# Patient Record
Sex: FEMALE | Race: WHITE | NOT HISPANIC OR LATINO | Employment: FULL TIME | URBAN - METROPOLITAN AREA
[De-identification: names, ages, dates, MRNs, and addresses within clinical notes are randomized per-mention and may not be internally consistent; named-entity substitution may affect disease eponyms.]

---

## 2018-04-03 ENCOUNTER — OFFICE VISIT - HEALTHEAST (OUTPATIENT)
Dept: INTERNAL MEDICINE | Facility: CLINIC | Age: 33
End: 2018-04-03

## 2018-04-03 DIAGNOSIS — F41.9 ANXIETY AND DEPRESSION: ICD-10-CM

## 2018-04-03 DIAGNOSIS — F32.A ANXIETY AND DEPRESSION: ICD-10-CM

## 2018-04-03 DIAGNOSIS — N34.2 URETHRITIS: ICD-10-CM

## 2018-04-03 DIAGNOSIS — K58.0 IRRITABLE BOWEL SYNDROME WITH DIARRHEA: ICD-10-CM

## 2018-04-03 ASSESSMENT — MIFFLIN-ST. JEOR: SCORE: 1164.12

## 2018-05-07 ENCOUNTER — OFFICE VISIT - HEALTHEAST (OUTPATIENT)
Dept: BEHAVIORAL HEALTH | Facility: CLINIC | Age: 33
End: 2018-05-07

## 2018-05-07 DIAGNOSIS — F41.1 GAD (GENERALIZED ANXIETY DISORDER): ICD-10-CM

## 2018-05-07 DIAGNOSIS — F33.1 MAJOR DEPRESSIVE DISORDER, RECURRENT EPISODE, MODERATE WITH ANXIOUS DISTRESS (H): ICD-10-CM

## 2018-05-16 ENCOUNTER — OFFICE VISIT - HEALTHEAST (OUTPATIENT)
Dept: INTERNAL MEDICINE | Facility: CLINIC | Age: 33
End: 2018-05-16

## 2018-05-16 DIAGNOSIS — F41.1 ANXIETY STATE: ICD-10-CM

## 2018-05-16 DIAGNOSIS — I95.9 LOW BLOOD PRESSURE: ICD-10-CM

## 2018-05-16 DIAGNOSIS — R53.83 FATIGUE: ICD-10-CM

## 2018-05-22 ENCOUNTER — COMMUNICATION - HEALTHEAST (OUTPATIENT)
Dept: BEHAVIORAL HEALTH | Facility: CLINIC | Age: 33
End: 2018-05-22

## 2018-06-07 ENCOUNTER — OFFICE VISIT - HEALTHEAST (OUTPATIENT)
Dept: BEHAVIORAL HEALTH | Facility: CLINIC | Age: 33
End: 2018-06-07

## 2018-06-07 DIAGNOSIS — F41.1 GAD (GENERALIZED ANXIETY DISORDER): ICD-10-CM

## 2018-06-07 DIAGNOSIS — F33.1 MAJOR DEPRESSIVE DISORDER, RECURRENT EPISODE, MODERATE WITH ANXIOUS DISTRESS (H): ICD-10-CM

## 2018-06-21 ENCOUNTER — OFFICE VISIT - HEALTHEAST (OUTPATIENT)
Dept: BEHAVIORAL HEALTH | Facility: CLINIC | Age: 33
End: 2018-06-21

## 2018-06-21 ENCOUNTER — AMBULATORY - HEALTHEAST (OUTPATIENT)
Dept: BEHAVIORAL HEALTH | Facility: CLINIC | Age: 33
End: 2018-06-21

## 2018-06-21 DIAGNOSIS — F41.1 GAD (GENERALIZED ANXIETY DISORDER): ICD-10-CM

## 2018-06-21 DIAGNOSIS — F33.1 MAJOR DEPRESSIVE DISORDER, RECURRENT EPISODE, MODERATE WITH ANXIOUS DISTRESS (H): ICD-10-CM

## 2018-07-16 ENCOUNTER — OFFICE VISIT - HEALTHEAST (OUTPATIENT)
Dept: BEHAVIORAL HEALTH | Facility: CLINIC | Age: 33
End: 2018-07-16

## 2018-07-16 DIAGNOSIS — F33.1 MAJOR DEPRESSIVE DISORDER, RECURRENT EPISODE, MODERATE WITH ANXIOUS DISTRESS (H): ICD-10-CM

## 2018-07-16 DIAGNOSIS — F41.1 GAD (GENERALIZED ANXIETY DISORDER): ICD-10-CM

## 2018-07-30 ENCOUNTER — OFFICE VISIT - HEALTHEAST (OUTPATIENT)
Dept: BEHAVIORAL HEALTH | Facility: CLINIC | Age: 33
End: 2018-07-30

## 2018-07-30 DIAGNOSIS — K58.0 IRRITABLE BOWEL SYNDROME WITH DIARRHEA: ICD-10-CM

## 2018-07-30 DIAGNOSIS — F33.1 MAJOR DEPRESSIVE DISORDER, RECURRENT EPISODE, MODERATE WITH ANXIOUS DISTRESS (H): ICD-10-CM

## 2018-07-30 DIAGNOSIS — F41.1 GAD (GENERALIZED ANXIETY DISORDER): ICD-10-CM

## 2018-08-06 ENCOUNTER — COMMUNICATION - HEALTHEAST (OUTPATIENT)
Dept: TELEHEALTH | Facility: CLINIC | Age: 33
End: 2018-08-06

## 2018-08-06 ENCOUNTER — OFFICE VISIT - HEALTHEAST (OUTPATIENT)
Dept: INTERNAL MEDICINE | Facility: CLINIC | Age: 33
End: 2018-08-06

## 2018-08-06 ENCOUNTER — OFFICE VISIT - HEALTHEAST (OUTPATIENT)
Dept: BEHAVIORAL HEALTH | Facility: CLINIC | Age: 33
End: 2018-08-06

## 2018-08-06 DIAGNOSIS — F41.9 ANXIETY AND DEPRESSION: ICD-10-CM

## 2018-08-06 DIAGNOSIS — R06.02 SOB (SHORTNESS OF BREATH): ICD-10-CM

## 2018-08-06 DIAGNOSIS — F32.A ANXIETY AND DEPRESSION: ICD-10-CM

## 2018-08-06 DIAGNOSIS — J45.31 MILD PERSISTENT ASTHMA WITH EXACERBATION: ICD-10-CM

## 2018-08-06 DIAGNOSIS — F41.1 GAD (GENERALIZED ANXIETY DISORDER): ICD-10-CM

## 2018-08-06 DIAGNOSIS — G47.8 ABNORMAL REM SLEEP: ICD-10-CM

## 2018-08-06 DIAGNOSIS — F33.1 MAJOR DEPRESSIVE DISORDER, RECURRENT EPISODE, MODERATE WITH ANXIOUS DISTRESS (H): ICD-10-CM

## 2018-08-06 DIAGNOSIS — R53.83 FATIGUE, UNSPECIFIED TYPE: ICD-10-CM

## 2018-08-06 LAB
ANION GAP SERPL CALCULATED.3IONS-SCNC: 12 MMOL/L (ref 5–18)
BASOPHILS # BLD AUTO: 0.1 THOU/UL (ref 0–0.2)
BASOPHILS NFR BLD AUTO: 1 % (ref 0–2)
BNP SERPL-MCNC: <10 PG/ML (ref 0–64)
BUN SERPL-MCNC: 9 MG/DL (ref 8–22)
CALCIUM SERPL-MCNC: 9.7 MG/DL (ref 8.5–10.5)
CHLORIDE BLD-SCNC: 106 MMOL/L (ref 98–107)
CO2 SERPL-SCNC: 24 MMOL/L (ref 22–31)
CREAT SERPL-MCNC: 0.74 MG/DL (ref 0.6–1.1)
EOSINOPHIL # BLD AUTO: 0.1 THOU/UL (ref 0–0.4)
EOSINOPHIL NFR BLD AUTO: 2 % (ref 0–6)
ERYTHROCYTE [DISTWIDTH] IN BLOOD BY AUTOMATED COUNT: 10.8 % (ref 11–14.5)
FOLATE SERPL-MCNC: 9.4 NG/ML
GFR SERPL CREATININE-BSD FRML MDRD: >60 ML/MIN/1.73M2
GLUCOSE BLD-MCNC: 72 MG/DL (ref 70–125)
HCG SERPL QL: NEGATIVE
HCT VFR BLD AUTO: 40.2 % (ref 35–47)
HGB BLD-MCNC: 13.7 G/DL (ref 12–16)
LYMPHOCYTES # BLD AUTO: 2.7 THOU/UL (ref 0.8–4.4)
LYMPHOCYTES NFR BLD AUTO: 31 % (ref 20–40)
MCH RBC QN AUTO: 30.1 PG (ref 27–34)
MCHC RBC AUTO-ENTMCNC: 34 G/DL (ref 32–36)
MCV RBC AUTO: 88 FL (ref 80–100)
MONOCYTES # BLD AUTO: 0.6 THOU/UL (ref 0–0.9)
MONOCYTES NFR BLD AUTO: 7 % (ref 2–10)
NEUTROPHILS # BLD AUTO: 5.4 THOU/UL (ref 2–7.7)
NEUTROPHILS NFR BLD AUTO: 60 % (ref 50–70)
PLATELET # BLD AUTO: 223 THOU/UL (ref 140–440)
PMV BLD AUTO: 8.5 FL (ref 7–10)
POTASSIUM BLD-SCNC: 3.8 MMOL/L (ref 3.5–5)
RBC # BLD AUTO: 4.54 MILL/UL (ref 3.8–5.4)
SODIUM SERPL-SCNC: 142 MMOL/L (ref 136–145)
TSH SERPL DL<=0.005 MIU/L-ACNC: 1.61 UIU/ML (ref 0.3–5)
VIT B12 SERPL-MCNC: 660 PG/ML (ref 213–816)
WBC: 8.9 THOU/UL (ref 4–11)

## 2018-08-06 ASSESSMENT — MIFFLIN-ST. JEOR: SCORE: 1207.49

## 2018-08-07 LAB
25(OH)D3 SERPL-MCNC: 28.3 NG/ML (ref 30–80)
25(OH)D3 SERPL-MCNC: 28.3 NG/ML (ref 30–80)

## 2018-08-15 ENCOUNTER — OFFICE VISIT - HEALTHEAST (OUTPATIENT)
Dept: BEHAVIORAL HEALTH | Facility: CLINIC | Age: 33
End: 2018-08-15

## 2018-08-15 DIAGNOSIS — F41.1 GAD (GENERALIZED ANXIETY DISORDER): ICD-10-CM

## 2018-08-15 DIAGNOSIS — F33.1 MAJOR DEPRESSIVE DISORDER, RECURRENT EPISODE, MODERATE WITH ANXIOUS DISTRESS (H): ICD-10-CM

## 2018-08-27 ENCOUNTER — COMMUNICATION - HEALTHEAST (OUTPATIENT)
Dept: INTERNAL MEDICINE | Facility: CLINIC | Age: 33
End: 2018-08-27

## 2018-09-17 ENCOUNTER — OFFICE VISIT - HEALTHEAST (OUTPATIENT)
Dept: BEHAVIORAL HEALTH | Facility: CLINIC | Age: 33
End: 2018-09-17

## 2018-09-17 DIAGNOSIS — F41.1 ANXIETY STATE: ICD-10-CM

## 2018-09-17 DIAGNOSIS — F41.1 GAD (GENERALIZED ANXIETY DISORDER): ICD-10-CM

## 2018-09-17 DIAGNOSIS — F33.1 MAJOR DEPRESSIVE DISORDER, RECURRENT EPISODE, MODERATE WITH ANXIOUS DISTRESS (H): ICD-10-CM

## 2018-11-02 ENCOUNTER — COMMUNICATION - HEALTHEAST (OUTPATIENT)
Dept: INTERNAL MEDICINE | Facility: CLINIC | Age: 33
End: 2018-11-02

## 2018-11-05 ENCOUNTER — COMMUNICATION - HEALTHEAST (OUTPATIENT)
Dept: INTERNAL MEDICINE | Facility: CLINIC | Age: 33
End: 2018-11-05

## 2018-11-12 ENCOUNTER — COMMUNICATION - HEALTHEAST (OUTPATIENT)
Dept: INTERNAL MEDICINE | Facility: CLINIC | Age: 33
End: 2018-11-12

## 2018-11-27 ENCOUNTER — OFFICE VISIT - HEALTHEAST (OUTPATIENT)
Dept: BEHAVIORAL HEALTH | Facility: CLINIC | Age: 33
End: 2018-11-27

## 2018-11-27 DIAGNOSIS — F33.1 MAJOR DEPRESSIVE DISORDER, RECURRENT EPISODE, MODERATE WITH ANXIOUS DISTRESS (H): ICD-10-CM

## 2018-11-27 DIAGNOSIS — F41.1 GAD (GENERALIZED ANXIETY DISORDER): ICD-10-CM

## 2018-12-28 ENCOUNTER — COMMUNICATION - HEALTHEAST (OUTPATIENT)
Dept: INTERNAL MEDICINE | Facility: CLINIC | Age: 33
End: 2018-12-28

## 2019-03-01 ENCOUNTER — COMMUNICATION - HEALTHEAST (OUTPATIENT)
Dept: INTERNAL MEDICINE | Facility: CLINIC | Age: 34
End: 2019-03-01

## 2019-03-27 ENCOUNTER — COMMUNICATION - HEALTHEAST (OUTPATIENT)
Dept: INTERNAL MEDICINE | Facility: CLINIC | Age: 34
End: 2019-03-27

## 2019-05-14 ENCOUNTER — AMBULATORY - HEALTHEAST (OUTPATIENT)
Dept: BEHAVIORAL HEALTH | Facility: CLINIC | Age: 34
End: 2019-05-14

## 2019-05-14 ENCOUNTER — OFFICE VISIT - HEALTHEAST (OUTPATIENT)
Dept: BEHAVIORAL HEALTH | Facility: CLINIC | Age: 34
End: 2019-05-14

## 2019-05-14 DIAGNOSIS — F41.1 GAD (GENERALIZED ANXIETY DISORDER): ICD-10-CM

## 2019-05-14 DIAGNOSIS — F33.1 MAJOR DEPRESSIVE DISORDER, RECURRENT EPISODE, MODERATE WITH ANXIOUS DISTRESS (H): ICD-10-CM

## 2019-05-28 ENCOUNTER — COMMUNICATION - HEALTHEAST (OUTPATIENT)
Dept: INTERNAL MEDICINE | Facility: CLINIC | Age: 34
End: 2019-05-28

## 2019-06-10 ENCOUNTER — COMMUNICATION - HEALTHEAST (OUTPATIENT)
Dept: INTERNAL MEDICINE | Facility: CLINIC | Age: 34
End: 2019-06-10

## 2019-06-23 ENCOUNTER — ANESTHESIA - HEALTHEAST (OUTPATIENT)
Dept: OBGYN | Facility: HOSPITAL | Age: 34
End: 2019-06-23

## 2019-06-24 ENCOUNTER — SURGERY - HEALTHEAST (OUTPATIENT)
Dept: OBGYN | Facility: HOSPITAL | Age: 34
End: 2019-06-24

## 2019-06-24 ASSESSMENT — MIFFLIN-ST. JEOR: SCORE: 1449.32

## 2019-06-27 ENCOUNTER — HOME CARE/HOSPICE - HEALTHEAST (OUTPATIENT)
Dept: HOME HEALTH SERVICES | Facility: HOME HEALTH | Age: 34
End: 2019-06-27

## 2019-06-28 ENCOUNTER — HOME CARE/HOSPICE - HEALTHEAST (OUTPATIENT)
Dept: HOME HEALTH SERVICES | Facility: HOME HEALTH | Age: 34
End: 2019-06-28

## 2019-07-05 ENCOUNTER — COMMUNICATION - HEALTHEAST (OUTPATIENT)
Dept: INTERNAL MEDICINE | Facility: CLINIC | Age: 34
End: 2019-07-05

## 2020-01-21 ENCOUNTER — AMBULATORY - HEALTHEAST (OUTPATIENT)
Dept: BEHAVIORAL HEALTH | Facility: CLINIC | Age: 35
End: 2020-01-21

## 2020-01-21 ENCOUNTER — OFFICE VISIT - HEALTHEAST (OUTPATIENT)
Dept: BEHAVIORAL HEALTH | Facility: CLINIC | Age: 35
End: 2020-01-21

## 2020-01-21 DIAGNOSIS — F41.1 GAD (GENERALIZED ANXIETY DISORDER): ICD-10-CM

## 2020-01-21 DIAGNOSIS — F33.1 MAJOR DEPRESSIVE DISORDER, RECURRENT EPISODE, MODERATE WITH ANXIOUS DISTRESS (H): ICD-10-CM

## 2020-01-21 ASSESSMENT — ANXIETY QUESTIONNAIRES
7. FEELING AFRAID AS IF SOMETHING AWFUL MIGHT HAPPEN: NEARLY EVERY DAY
2. NOT BEING ABLE TO STOP OR CONTROL WORRYING: NEARLY EVERY DAY
4. TROUBLE RELAXING: NEARLY EVERY DAY
6. BECOMING EASILY ANNOYED OR IRRITABLE: NEARLY EVERY DAY
1. FEELING NERVOUS, ANXIOUS, OR ON EDGE: NEARLY EVERY DAY
IF YOU CHECKED OFF ANY PROBLEMS ON THIS QUESTIONNAIRE, HOW DIFFICULT HAVE THESE PROBLEMS MADE IT FOR YOU TO DO YOUR WORK, TAKE CARE OF THINGS AT HOME, OR GET ALONG WITH OTHER PEOPLE: VERY DIFFICULT
5. BEING SO RESTLESS THAT IT IS HARD TO SIT STILL: SEVERAL DAYS
3. WORRYING TOO MUCH ABOUT DIFFERENT THINGS: NEARLY EVERY DAY
GAD7 TOTAL SCORE: 19

## 2020-01-21 ASSESSMENT — PATIENT HEALTH QUESTIONNAIRE - PHQ9: SUM OF ALL RESPONSES TO PHQ QUESTIONS 1-9: 13

## 2020-01-28 ENCOUNTER — OFFICE VISIT - HEALTHEAST (OUTPATIENT)
Dept: BEHAVIORAL HEALTH | Facility: CLINIC | Age: 35
End: 2020-01-28

## 2020-01-28 DIAGNOSIS — F41.1 GAD (GENERALIZED ANXIETY DISORDER): ICD-10-CM

## 2020-01-28 DIAGNOSIS — F33.1 MAJOR DEPRESSIVE DISORDER, RECURRENT EPISODE, MODERATE WITH ANXIOUS DISTRESS (H): ICD-10-CM

## 2020-02-28 ENCOUNTER — OFFICE VISIT - HEALTHEAST (OUTPATIENT)
Dept: BEHAVIORAL HEALTH | Facility: CLINIC | Age: 35
End: 2020-02-28

## 2020-02-28 DIAGNOSIS — F41.1 GAD (GENERALIZED ANXIETY DISORDER): ICD-10-CM

## 2020-02-28 DIAGNOSIS — F33.1 MAJOR DEPRESSIVE DISORDER, RECURRENT EPISODE, MODERATE WITH ANXIOUS DISTRESS (H): ICD-10-CM

## 2020-02-28 ASSESSMENT — PATIENT HEALTH QUESTIONNAIRE - PHQ9: SUM OF ALL RESPONSES TO PHQ QUESTIONS 1-9: 9

## 2020-02-28 ASSESSMENT — ANXIETY QUESTIONNAIRES
3. WORRYING TOO MUCH ABOUT DIFFERENT THINGS: MORE THAN HALF THE DAYS
GAD7 TOTAL SCORE: 11
1. FEELING NERVOUS, ANXIOUS, OR ON EDGE: MORE THAN HALF THE DAYS
6. BECOMING EASILY ANNOYED OR IRRITABLE: MORE THAN HALF THE DAYS
IF YOU CHECKED OFF ANY PROBLEMS ON THIS QUESTIONNAIRE, HOW DIFFICULT HAVE THESE PROBLEMS MADE IT FOR YOU TO DO YOUR WORK, TAKE CARE OF THINGS AT HOME, OR GET ALONG WITH OTHER PEOPLE: SOMEWHAT DIFFICULT
7. FEELING AFRAID AS IF SOMETHING AWFUL MIGHT HAPPEN: SEVERAL DAYS
4. TROUBLE RELAXING: MORE THAN HALF THE DAYS
2. NOT BEING ABLE TO STOP OR CONTROL WORRYING: SEVERAL DAYS
5. BEING SO RESTLESS THAT IT IS HARD TO SIT STILL: SEVERAL DAYS

## 2020-03-13 ENCOUNTER — AMBULATORY - HEALTHEAST (OUTPATIENT)
Dept: BEHAVIORAL HEALTH | Facility: CLINIC | Age: 35
End: 2020-03-13

## 2021-01-28 ENCOUNTER — AMBULATORY - HEALTHEAST (OUTPATIENT)
Dept: NURSING | Facility: CLINIC | Age: 36
End: 2021-01-28

## 2021-02-02 ENCOUNTER — COMMUNICATION - HEALTHEAST (OUTPATIENT)
Dept: INTERNAL MEDICINE | Facility: CLINIC | Age: 36
End: 2021-02-02

## 2021-02-18 ENCOUNTER — AMBULATORY - HEALTHEAST (OUTPATIENT)
Dept: NURSING | Facility: CLINIC | Age: 36
End: 2021-02-18

## 2021-05-01 ENCOUNTER — HEALTH MAINTENANCE LETTER (OUTPATIENT)
Age: 36
End: 2021-05-01

## 2021-05-27 ASSESSMENT — PATIENT HEALTH QUESTIONNAIRE - PHQ9
SUM OF ALL RESPONSES TO PHQ QUESTIONS 1-9: 9
SUM OF ALL RESPONSES TO PHQ QUESTIONS 1-9: 13

## 2021-05-28 ASSESSMENT — ANXIETY QUESTIONNAIRES
GAD7 TOTAL SCORE: 19
GAD7 TOTAL SCORE: 11

## 2021-05-28 NOTE — PROGRESS NOTES
Mental Health Visit Note    5/14/2019    Start time: 8:00    Stop Time: 9:00   Session # 9    Anabell Brumfield is a 33 y.o. female is being seen today for a follow-up psychotherapy appointment    Chief Complaint   Patient presents with     Follow-up     pt pregnant and worried about post partum depression returning     Anxiety     Depression   .     New symptoms or complaints:  Pt 6.5 months pregnant    Functional Impairment:   The patient identifies the how daily stressors affect daily functioning in today's session as follows (Scale is 1-4, 1=not at all or rarely; 4=extremely so/everyday.)    Personal: 3   Family: 2  Social: 3  Work: 2    Clinical assessment of mental status:   Anabell Brumfield presented on time.  No change since last session on 11/27/2018.  She was oriented x3, open and cooperative, and dressed appropriately for this session and weather. Her memory was Normal cognitive functioning .  Her speech was  Within normal.  Language was normal.  Concentration and focus is Within normal. Psychosis is not noted or reported. She reports her mood is Congruent w/content of speech.  Affect is congruent with speech and is Congruent w/content of speech.  Fund of knowledge is adequate. Insight is adequate for therapy.     Suicidal/Homicidal Ideation present:   No,  Patient denies suicidal and homicidal ideations/means or plans.      Patient's impression of their current status:  Pt returns to therapy after becoming pregnant due to worries that she may have a recurrence of PPD experienced during first birth. She has noticed some acting out behaviors and wants to return to working on improving mental health.  She reports return to therapy due to increase in anxiety and cravings to return to some acting out addictive behaviors. She struggles with Pica and prevents self from eating dirt only as she knows it could harm baby. Tends to minimize number of stressors in life then question why she feels unsettled. Processed  and noted stressors including prengancy, financial, work, relationship issues, 's return to alcohol use.  Reported previous therapy sessions helped her to manage emotions and put thoughts and feelings in perspective. Having trouble respecting self and disappointed in self despite all she is managing. Pt due to give birth in June and excited but having some depression related to hormonal changes. Reports desire to cont to re engage in therapy to discuss concerns, work 5th step and develop coping skills. Practiced self compassion break at session end and she found it helpful      Therapist impression of patient's current state:   Anabell Brumfield presents with increased anxiety and concerns about possibility of return of PP  depression and craving to return to addictive behaviors. She was direct and honest during session focusing on nature of thoughts and feelings and ways of coping with stressors and neg thoughts. Worries about neg thoughts that arise spontaneously that have her thinking of eating dirt, depression and fear in her mind.  Processed these concerns and she found sharing them helpful to relieve shame and anxiety Will cont to work on behavioral changes that can improve mood and outlook.     Type of psychotherapeutic technique provided:   Client centered and CBT    Progress toward short term goals: pt returned to therapy and re-established dialogue and scheduled follow up    Review of long term goals: Long-term goals reviewed this date. see tx plan . Date of last review 5/14/2019  .    Diagnosis:   1. Major depressive disorder, recurrent episode, moderate with anxious distress (H)    2. SYD (generalized anxiety disorder)    improved    Plan and Follow-up:   Patient will follow safety plan of seeking help from friend/family, calling [facility] crisis, calling 911, and/or presenting to the ED if necessary.  Patient will be compliant with medications and attend appointments as scheduled.  Pt will work on  informal mindfulness, ACT how she'd like partner to remember her in 10 yrs,   Cont to read SLAA book  Cont to monitor pregnancy and avoid risky behaviors    Discharge Criteria/Planning: Patient will continue with follow-up until therapy can be discontinued without return of signs and symptoms.    Signatures:   Performed and documented by SHERICE Bustamante, LICSW, LADC

## 2021-05-29 NOTE — ANESTHESIA CARE TRANSFER NOTE
Last vitals:   Vitals:    06/24/19 1445   BP: 104/51   Pulse: 73   Resp: 12   Temp: 37.1  C (98.8  F)   SpO2: 98%     Patient's level of consciousness is drowsy  Spontaneous respirations: yes  Maintains airway independently: yes  Dentition unchanged: yes  Oropharynx: oropharynx clear of all foreign objects    QCDR Measures:  ASA# 20 - Surgical Safety Checklist: WHO surgical safety checklist completed prior to induction    PQRS# 430 - Adult PONV Prevention: 4558F - Pt received => 2 anti-emetic agents (different classes) preop & intraop  ASA# 8 - Peds PONV Prevention: NA - Not pediatric patient, not GA or 2 or more risk factors NOT present  PQRS# 424 - Keri-op Temp Management: 4559F - At least one body temp DOCUMENTED => 35.5C or 95.9F within required timeframe  PQRS# 426 - PACU Transfer Protocol: - Transfer of care checklist used  ASA# 14 - Acute Post-op Pain: ASA14B - Patient did NOT experience pain >= 7 out of 10

## 2021-05-29 NOTE — ANESTHESIA PROCEDURE NOTES
Peripheral Block    Patient location during procedure: OR  Start time: 6/24/2019 2:31 PM  End time: 6/24/2019 2:36 PM  post-op analgesia per surgeon order as noted in medical record  Staffing:  Performing  Anesthesiologist: Rahul Kong MD  Preanesthetic Checklist  Completed: patient identified, site marked, risks, benefits, and alternatives discussed, timeout performed, consent obtained, airway assessed, oxygen available, suction available, emergency drugs available and hand hygiene performed  Peripheral Block  Block type: other, TAP  Prep: ChloraPrep  Patient position: supine  Patient monitoring: cardiac monitor, continuous pulse oximetry, heart rate and blood pressure  Laterality: bilateral, same technique used bilaterally  Injection technique: ultrasound guided            Needle  Needle type: Stimuplex   Needle gauge: 20G  Needle length: 6 in  no peripheral nerve catheter placed  Assessment  Injection assessment: no difficulty with injection, negative aspiration for heme, no paresthesia on injection and incremental injection

## 2021-05-29 NOTE — ANESTHESIA PREPROCEDURE EVALUATION
Anesthesia Evaluation      Patient summary reviewed   No history of anesthetic complications     Airway   Mallampati: II   Pulmonary - normal exam   (+) asthma  mild,                          Cardiovascular - negative ROS and normal exam  Exercise tolerance: > or = 4 METS  Rhythm: regular  Rate: normal,         Neuro/Psych - negative ROS     Endo/Other    (+) pregnant     GI/Hepatic/Renal - negative ROS           Dental - normal exam                        Anesthesia Plan  Planned anesthetic: spinal  TAP block post procedure  ASA 2     Anesthetic plan and risks discussed with: patient    Post-op plan: routine recovery

## 2021-05-30 ENCOUNTER — RECORDS - HEALTHEAST (OUTPATIENT)
Dept: ADMINISTRATIVE | Facility: CLINIC | Age: 36
End: 2021-05-30

## 2021-05-30 NOTE — ANESTHESIA POSTPROCEDURE EVALUATION
Patient: Anabell Brumfield  REPEAT  SECTION  Anesthesia type: spinal    Patient location: Labor and Delivery  Last vitals:   Vitals Value Taken Time   /56 2019  8:04 AM   Temp 36.6  C (97.9  F) 2019  8:04 AM   Pulse 75 2019  8:04 AM   Resp 16 2019  8:04 AM   SpO2 100 % 2019  8:04 AM     Post vital signs: stable  Level of consciousness: awake and responds to simple questions  Post-anesthesia pain: pain controlled  Post-anesthesia nausea and vomiting: no  Pulmonary: unassisted, return to baseline  Cardiovascular: stable and blood pressure at baseline  Hydration: adequate  Anesthetic events: no    QCDR Measures:  ASA# 11 - Keri-op Cardiac Arrest: ASA11B - Patient did NOT experience unanticipated cardiac arrest  ASA# 12 - Keri-op Mortality Rate: ASA12B - Patient did NOT die  ASA# 13 - PACU Re-Intubation Rate: NA - No ETT / LMA used for case  ASA# 10 - Composite Anes Safety: ASA10A - No serious adverse event    Additional Notes:

## 2021-06-01 VITALS — HEIGHT: 61 IN | WEIGHT: 117 LBS | BODY MASS INDEX: 22.09 KG/M2

## 2021-06-01 VITALS — WEIGHT: 126.56 LBS | BODY MASS INDEX: 23.9 KG/M2 | HEIGHT: 61 IN

## 2021-06-01 VITALS — BODY MASS INDEX: 22.98 KG/M2 | WEIGHT: 120.6 LBS

## 2021-06-03 VITALS — BODY MASS INDEX: 27.49 KG/M2 | HEIGHT: 65 IN | WEIGHT: 165 LBS

## 2021-06-05 NOTE — PROGRESS NOTES
Outpatient Mental Health Treatment Plan     Name:  Anabell Brumfield  :  1985  MRN:  [id number]     Treatment Plan:  Updated Treatment Plan  Intake/initial treatment plan date:  2018     Benefit and risks and alternatives have been discussed: Yes  Is this treatment appropriate with minimal intrusion/restrictions: Yes  Estimated duration of treatment:  Initial trial of 20 sessions, to be reviewed.  Anticipated frequency of services:  Every 2 weeks  Necessity for frequency: This frequency is needed to establish therapeutic goals and for continuity of care in order to monitor progress.  Necessity for treatment: To address cognitive, behavioral, and/or emotional barriers in order to work toward goals and to improve quality of life.        Plan:      ? Depression                 Goal:    Decrease average depression level from 7 to 5.              Strategies:       ?[x]? Review factors contributing to depression as way of normalizing your response to changes                                      [x]? Increase involvement in meaningful activities outside of childrearing by making time each week for self                                      ?[x]? Discuss sleep hygiene with psychotherapist and read info with tips on better sleep                                      ?[x]? Explore thoughts and expectations about self and others during this time of transition                                      ?[x]? Process grief (loss of significant person, independence, role,                                     etc.)                                      ?[x]? Assess for suicide risk                                      ?[x]? follow recommendations of doctors re: self care an childrearing                                      [x]? Utilize reading materials provided by psychotherapist                                      [x]? Continue compliance with medical treatment plan (or explore                                       barriers)                                         ?     Degree to which this is a problem (1-4): 3   Degree to which goal is met (1-4)2  Date of Review: 1/21/2020                                                                                  ?              ? Anxiety                        Goal:    Decrease average anxiety level from 6 to 5.              Strategies:       ? [x]?Learn and practice relaxation techniques and other coping                                         strategies (e.g., thought stopping, reframing, meditation)                                      ? [x]? Increase involvement in meaningful activities                                      ? [x]? maintain improved sleep hygiene                                      ? [x]? Explore thoughts and expectations about self and others                                      ? [x]? Identify and monitor triggers for panic/anxiety symptoms                                      ? [x]? Implement appropriate physical activity routine during pregnancy                                      ? [x]? Consider introduction of bibliotherapy and/or videos                                      ? [x]? Continue compliance with medical treatment plan (or explore                                      barriers)              Degree to which this is a problem (1-4): 3   Degree to which goal is met (1-4)2  Date of Review:1/21/2020                         Interpersonal stress               Goal:    Increase % satisfaction with relationships from 60 to 70.  Strategies:       ?[x]? Learn assertive communication skills through role-play and             other techniques                                      ?[x]?  Explore thoughts and expectations about self and others                                       ?[x]?  Learn and practice relaxation techniques and other coping                                          strategies                                      [x]? Watch [employer] video and relate elements of trust to relationships with others                                      ?[x]? Explore readings provided by psychotherapist related to communication and relationships                                      ?    Degree to which this is a problem (1-4): 3   Degree to which goal is met (1-4)2  Date of Review 1/21/2020             Functional Impairment: 1=Not at all/Rarely  2=Some days  3=Most Days  4=Every Day   Personal: 2  Family: 3  Social: 2  Work: 1     Diagnosis:   Major depressive disorder, recurrent, moderate with anxious distress; Generalized Anxiety Disorder, History of PPD     WHODAS 2.0 12-item version= 21% no problem  H1= 0  H2= 0  H3= 0  Clinical assessments completed: SYD-7, PHQ-9, Mood Questionnaire current, CAGE-AID, C-SSRI        Strengths:  Strengths/personal resources (what does the patient do well, what is going well in life, positive personality characteristics):  Patient reports that she is hard-working, good caretaker, empathic, creative and a good listener.     Weaknesses (what does patient identify as a weakness):  She reports she has trouble learning, reading, talking with others, focusing, and being really physical and in touch with her body.  She also reports that she does not think she has a strong IQ     Cultural Considerations:  Patient attends individual therapy with self determination to feel better.  Patient uses [facility] and has health insurance and is able to navigate the system.        Persons responsible for this plan:  ? [x]? Patient           ? [x]? Provider         ? []? Other: __________________        Provider:Performed and documented by SHERICE Lowe- SUNDAY; [facility] 1/21/2020          Date:  1/21/2020       Time:  9:00AM           Patient Signature:____________________________________ Date: ______________         Therapist  Signature: __________________________________ Date: ______________

## 2021-06-05 NOTE — PROGRESS NOTES
Mental Health Visit Note    1/21/2020    Start time: 8:00    Stop Time: 9:00   Session # 1    Anabell Brumfield is a 34 y.o. female is being seen today for a follow-up psychotherapy appointment    Chief Complaint   Patient presents with      Follow Up     Anxiety     parental stress     Depression     PPD   .     New symptoms or complaints:  Some PPD since new year    Functional Impairment:   The patient identifies the how daily stressors affect daily functioning in today's session as follows (Scale is 1-4, 1=not at all or rarely; 4=extremely so/everyday.)    Personal: 3   Family: 2  Social: 3  Work: 2    Clinical assessment of mental status:   Anabell Brumfield presented on time.  No change since last session on 5/19/2019.  She was oriented x3, open and cooperative, and dressed appropriately for this session and weather. Her memory was Normal cognitive functioning .  Her speech was  Within normal.  Language was normal.  Concentration and focus is Within normal. Psychosis is not noted or reported. She reports her mood is Congruent w/content of speech.  Affect is congruent with speech and is Congruent w/content of speech.  Fund of knowledge is adequate. Insight is adequate for therapy.     Suicidal/Homicidal Ideation present:   No,  Patient denies suicidal and homicidal ideations/means or plans.      Patient's impression of their current status:  Pt returns to therapy after giving birth to second son in June of last year due to worries that she may have a delayed recurrence of PPD experienced during first birth. She scores 13 on the PHQ 9 reporting daily trouble sleeping daily fatigue most days feeling down and depressed feeling bad about herself and several days having trouble with concentration overeating or under eating and lack of interest in doing things.  Patient has lost a significant amount of weight since pregnancy and is happy about that but can be concerned about appetite and its connection to emotional  "distress.  Patient also continues to feel like a fraud at work, she questions herself and her abilities occupationally, and her relationship with her  and her children and spiritually.  Patient continues to present is jovial and easygoing with smiles and laughter but scores high on mental health screenings and reports great stress in areas of finances, relationships, work, and childrearing.  She continues to deal with some of the same problems she struggles with during prior sessions a year ago.  These include:   Having trouble respecting self and disappointed in self despite all she is managing. Pt with birth in June and excited but having some depression related to hormonal changes. Reports desire to cont to re engage in therapy to discuss concerns, work 5th step and develop coping skills.  When asked to describe her self patient had a hard time doing so she says that she has stopped looking in the mirror literally and figuratively and really has a hard time understanding or being in touch with who she has because she is so busy acting.  She mentioned the last time she got a clear sense of herself and her identity was when tripping on LSD this past summer.  When she stops nursing her child she would like to take another \"acid trip\" to gain greater insight.  We discussed alternative ways of gaining self-awareness and getting in touch with feelings including psychotherapy, mindfulness, taking time for herself, acupuncture, spending time with friends.  We practiced self compassion break at session end and she found it helpful. Updated tx plan      Therapist impression of patient's current state:   Anabell Brumfield presents with increased anxiety and concerns about possibility of return of PP  depression with no mention of craving to return to addictive behaviors. She was direct and honest during session focusing on nature of thoughts and feelings and ways of coping with stressors and neg thoughts. Worries about " neg thoughts that arise spontaneously that have her thinking that she is unworthy, and that bad things will happen to her or her family.  Patient would like to get more in touch with her authentic self and believes that by taking more time for herself and meditation she can gain a greater sense of self..  Processed these concerns and she found sharing them helpful to relieve shame and anxiety Will cont to work on behavioral changes that can improve mood and outlook. Updated tx plan    Type of psychotherapeutic technique provided:   Client centered and CBT    Progress toward short term goals: pt returned to therapy and re-established dialogue and scheduled follow up    Review of long term goals: Long-term goals reviewed this date. see tx plan . Will update tx plan at next session . Date of last review 1/21/2020  .    Diagnosis:   1. Major depressive disorder, recurrent episode, moderate with anxious distress (H)    2. SYD (generalized anxiety disorder)    improved    Plan and Follow-up:   Patient will follow safety plan of seeking help from friend/family, calling [facility], calling 911, and/or presenting to the ED if necessary.  Patient will be compliant with medications and attend appointments as scheduled.  Pt will work on informal mindfulness, ACT how she'd like partner to remember her in 10 yrs,   Cont to read SLAA book  Cont to monitor pregnancy and avoid risky behaviors    HOMEWORK  Practice self compassion exercises  Sign updated tx plan    Discharge Criteria/Planning: Patient will continue with follow-up until therapy can be discontinued without return of signs and symptoms.    Signatures:   Performed and documented by Jon Vallejo, SHERICE, LICSW, LADC

## 2021-06-05 NOTE — PROGRESS NOTES
Mental Health Visit Note    1/28/2020    Start time: 8:04    Stop Time: 9:00   Session # 2    Anabell Brumfield is a 34 y.o. female is being seen today for a follow-up psychotherapy appointment    Chief Complaint   Patient presents with      Follow Up     Anxiety     work, financial stress     Depression     ppd   .     New symptoms or complaints:  none    Functional Impairment:   The patient identifies the how daily stressors affect daily functioning in today's session as follows (Scale is 1-4, 1=not at all or rarely; 4=extremely so/everyday.)    Personal: 3   Family: 2  Social: 3  Work: 2    Clinical assessment of mental status:   Anabell Brumfield presented on time.  No change since last session on 1/21/2020.  She was oriented x3, open and cooperative, and dressed appropriately for this session and weather. Her memory was Normal cognitive functioning .  Her speech was  Within normal.  Language was normal.  Concentration and focus is Within normal. Psychosis is not noted or reported. She reports her mood is Congruent w/content of speech.  Affect is congruent with speech and is Congruent w/content of speech.  Fund of knowledge is adequate. Insight is adequate for therapy.     Suicidal/Homicidal Ideation present:   No,  Patient denies suicidal and homicidal ideations/means or plans.      Patient's impression of their current status:  Pt reports anxiety over work performance, finances, childrearing and some depression and feels unable to transcend emotional concerns. She continues to feel like a fraud at work, she questions herself and her abilities occupationally, and her relationship with her  and her children and spiritually.  Patient continues to present is jovial and easygoing with smiles and laughter and discussed ways that is both a mask but also and awareness deep down that she can handle problems and that things generally work out.   Used CBT and client centered care to reframe concerns and see things  from more hopeful perspectives.  We discussed alternative ways of gaining self-awareness and getting in touch with feelings including psychotherapy, mindfulness, taking time for herself, acupuncture, spending time with friends.       Therapist impression of patient's current state:   Anabell Brumfield presents with continued anxiety and concerns about depressed mood that is related to changes in hormones and responsibilities after birth of second child. She was direct and honest during session focusing on nature of thoughts and feelings and ways of coping with stressors and neg thoughts. We examined orries about neg thoughts that arise spontaneously that have her thinking that she is unworthy, and that bad things will happen to her or her family and pt able to reframe and explore cause of emotional distress.  Patient reports therapy difficult but also helps her in frustrating way to get more in touch with her authentic self through listening to self and believes that by taking more time for herself and meditation she can gain a greater sense of self..  Processed these concerns and she found sharing them helpful to relieve shame and anxiety Will cont to work on behavioral changes that can improve mood and outlook.     Type of psychotherapeutic technique provided:   Client centered and CBT    Progress toward short term goals: pt returned to therapy and re-established dialogue and scheduled follow up    Review of long term goals: Long-term goals reviewed this date. see tx plan . Better manage anxiety and depression. Develop greater awareness of origins, validity of neg ruminations . Date of last review 1/21/2020.    Diagnosis:   1. Major depressive disorder, recurrent episode, moderate with anxious distress (H)    2. SYD (generalized anxiety disorder)    improved    Plan and Follow-up:   Patient will follow safety plan of seeking help from friend/family, calling [facility] crisis, calling 911, and/or presenting to the ED if  necessary.  Patient will be compliant with medications and attend appointments as scheduled.  Pt will work on informal mindfulness, ACT how she'd like partner to remember her in 10 yrs,   Cont to read SL[employer] book  Cont to monitor pregnancy and avoid risky behaviors    HOMEWORK  Practice self compassion exercises  Sign updated tx plan    Discharge Criteria/Planning: Patient will continue with follow-up until therapy can be discontinued without return of signs and symptoms.    Signatures:   Performed and documented by Jon Vallejo, SHERICE, LICSW, LADC

## 2021-06-06 NOTE — PROGRESS NOTES
Mental Health Visit Note    2/28/2020    Start time: 11:04    Stop Time: 11:55   Session # 3    Anabell Burmfield is a 34 y.o. female is being seen today for a follow-up psychotherapy appointment    Chief Complaint   Patient presents with      Follow Up     Depression     post partum depression     Anxiety     work stress   .     New symptoms or complaints:  none    Functional Impairment:   The patient identifies the how daily stressors affect daily functioning in today's session as follows (Scale is 1-4, 1=not at all or rarely; 4=extremely so/everyday.)    Personal: 3   Family: 2  Social: 3  Work: 2    Clinical assessment of mental status:   Anabell Brumfield presented on time.  No change since last session on 1/28/2020.  She was oriented x3, open and cooperative, and dressed appropriately for this session and weather. Her memory was Normal cognitive functioning .  Her speech was  Within normal.  Language was normal.  Concentration and focus is Within normal. Psychosis is not noted or reported. She reports her mood is Congruent w/content of speech.  Affect is congruent with speech and is Congruent w/content of speech.  Fund of knowledge is adequate. Insight is adequate for therapy.     Suicidal/Homicidal Ideation present:   No,  Patient denies suicidal and homicidal ideations/means or plans.      Patient's impression of their current status:  Pt reports continued anxiety over work performance, finances, childrearing and some depression and feels unable to transcend emotional concerns. She continues to feel like a fraud at work, she questions herself and her abilities occupationally, and her relationship with her  and her children and spiritually.  Patient continues to present is jovial and easygoing with smiles and laughter and discussed ways that is both a mask but also and awareness deep down that she can handle problems and that things generally work out.   Used CBT and client centered care to reframe  concerns and see things from more hopeful perspectives.  We discussed alternative ways of gaining self-awareness and getting in touch with feelings including psychotherapy, mindfulness, taking time for herself, acupuncture, spending time with friends.       Therapist impression of patient's current state:   Anabell Brumfield discussed concerns over relationship conflicts and arguments with  and she cont to present with continued anxiety and concerns about depressed mood that is related to changes in hormones and responsibilities after birth of second child. She was direct and honest during session focusing on nature of thoughts and feelings and ways of coping with stressors and neg thoughts. We examined orries about neg thoughts that arise spontaneously that have her thinking that she is unworthy, and that bad things will happen to her or her family and pt able to reframe and explore cause of emotional distress.  Patient reports therapy difficult but also helps her in frustrating way to get more in touch with her authentic self through listening to self and believes that by taking more time for herself and meditation she can gain a greater sense of self..  Processed these concerns and she found sharing them helpful to relieve shame and anxiety Will cont to work on behavioral changes that can improve mood and outlook. She denies any SI intent or plan    Type of psychotherapeutic technique provided:   Client centered and CBT    Progress toward short term goals: pt returned to therapy, improving mood, cont dialogue and scheduled follow up    Review of long term goals: Long-term goals reviewed this date. see tx plan . Better manage anxiety and depression. Develop greater awareness of origins, validity of neg ruminations . Date of last review 1/21/2020.    Diagnosis:   1. Major depressive disorder, recurrent episode, moderate with anxious distress (H)    2. SYD (generalized anxiety disorder)    improved    Plan and  Follow-up:   Patient will follow safety plan of seeking help from friend/family, calling [facility] [place] [place], calling 911, and/or presenting to the ED if necessary.  Patient will be compliant with medications and attend appointments as scheduled.  Pt will work on informal mindfulness, ACT how she'd like partner to remember her in 10 yrs,   Cont to read SLAA book  Cont to monitor pregnancy and avoid risky behaviors    HOMEWORK  Practice self compassion exercises  Sign updated tx plan    Discharge Criteria/Planning: Patient will continue with follow-up until therapy can be discontinued without return of signs and symptoms.    Signatures:   Performed and documented by SHERICE Bustamante, LICSW, LADC

## 2021-06-08 ENCOUNTER — OFFICE VISIT - HEALTHEAST (OUTPATIENT)
Dept: INTERNAL MEDICINE | Facility: CLINIC | Age: 36
End: 2021-06-08

## 2021-06-08 DIAGNOSIS — F33.2 SEVERE EPISODE OF RECURRENT MAJOR DEPRESSIVE DISORDER, WITHOUT PSYCHOTIC FEATURES (H): ICD-10-CM

## 2021-06-08 DIAGNOSIS — L50.9 HIVES: ICD-10-CM

## 2021-06-08 DIAGNOSIS — F41.1 GENERALIZED ANXIETY DISORDER: ICD-10-CM

## 2021-06-08 LAB
ALBUMIN SERPL-MCNC: 4.2 G/DL (ref 3.5–5)
ALP SERPL-CCNC: 63 U/L (ref 45–120)
ALT SERPL W P-5'-P-CCNC: 11 U/L (ref 0–45)
ANION GAP SERPL CALCULATED.3IONS-SCNC: 11 MMOL/L (ref 5–18)
AST SERPL W P-5'-P-CCNC: 15 U/L (ref 0–40)
BASOPHILS # BLD AUTO: 0 THOU/UL (ref 0–0.2)
BASOPHILS NFR BLD AUTO: 0 % (ref 0–2)
BILIRUB SERPL-MCNC: 0.5 MG/DL (ref 0–1)
BUN SERPL-MCNC: 15 MG/DL (ref 8–22)
CALCIUM SERPL-MCNC: 9.2 MG/DL (ref 8.5–10.5)
CHLORIDE BLD-SCNC: 103 MMOL/L (ref 98–107)
CO2 SERPL-SCNC: 25 MMOL/L (ref 22–31)
CREAT SERPL-MCNC: 0.7 MG/DL (ref 0.6–1.1)
EOSINOPHIL # BLD AUTO: 0.2 THOU/UL (ref 0–0.4)
EOSINOPHIL NFR BLD AUTO: 2 % (ref 0–6)
ERYTHROCYTE [DISTWIDTH] IN BLOOD BY AUTOMATED COUNT: 11.5 % (ref 11–14.5)
GFR SERPL CREATININE-BSD FRML MDRD: >60 ML/MIN/1.73M2
GLUCOSE BLD-MCNC: 83 MG/DL (ref 70–125)
HCT VFR BLD AUTO: 41.4 % (ref 35–47)
HGB BLD-MCNC: 13.7 G/DL (ref 12–16)
IMM GRANULOCYTES # BLD: 0 THOU/UL
IMM GRANULOCYTES NFR BLD: 0 %
LYMPHOCYTES # BLD AUTO: 2.3 THOU/UL (ref 0.8–4.4)
LYMPHOCYTES NFR BLD AUTO: 25 % (ref 20–40)
MCH RBC QN AUTO: 29.3 PG (ref 27–34)
MCHC RBC AUTO-ENTMCNC: 33.1 G/DL (ref 32–36)
MCV RBC AUTO: 89 FL (ref 80–100)
MONOCYTES # BLD AUTO: 0.8 THOU/UL (ref 0–0.9)
MONOCYTES NFR BLD AUTO: 9 % (ref 2–10)
NEUTROPHILS # BLD AUTO: 5.8 THOU/UL (ref 2–7.7)
NEUTROPHILS NFR BLD AUTO: 64 % (ref 50–70)
PLATELET # BLD AUTO: 272 THOU/UL (ref 140–440)
PMV BLD AUTO: 10.5 FL (ref 7–10)
POTASSIUM BLD-SCNC: 4.2 MMOL/L (ref 3.5–5)
PROT SERPL-MCNC: 7.1 G/DL (ref 6–8)
RBC # BLD AUTO: 4.67 MILL/UL (ref 3.8–5.4)
SODIUM SERPL-SCNC: 139 MMOL/L (ref 136–145)
TSH SERPL DL<=0.005 MIU/L-ACNC: 0.81 UIU/ML (ref 0.3–5)
WBC: 9.1 THOU/UL (ref 4–11)

## 2021-06-08 RX ORDER — ESCITALOPRAM OXALATE 10 MG/1
5 TABLET ORAL DAILY
Refills: 0 | Status: SHIPPED | OUTPATIENT
Start: 2021-06-08 | End: 2021-06-14

## 2021-06-08 RX ORDER — HYDROXYZINE HYDROCHLORIDE 25 MG/1
25 TABLET, FILM COATED ORAL EVERY 6 HOURS PRN
Qty: 30 TABLET | Refills: 0 | Status: SHIPPED | OUTPATIENT
Start: 2021-06-08 | End: 2024-02-23

## 2021-06-08 RX ORDER — ESCITALOPRAM OXALATE 10 MG/1
TABLET ORAL
Qty: 27 TABLET | Status: SHIPPED | OUTPATIENT
Start: 2021-06-08 | End: 2021-07-08

## 2021-06-08 RX ORDER — ESCITALOPRAM OXALATE 10 MG/1
10 TABLET ORAL DAILY
Refills: 0 | Status: SHIPPED | OUTPATIENT
Start: 2021-06-15 | End: 2021-07-07

## 2021-06-08 RX ORDER — CETIRIZINE HYDROCHLORIDE 10 MG/1
10 TABLET ORAL 2 TIMES DAILY
Qty: 60 TABLET | Refills: 0 | Status: SHIPPED | OUTPATIENT
Start: 2021-06-08 | End: 2024-02-23

## 2021-06-08 ASSESSMENT — ANXIETY QUESTIONNAIRES
2. NOT BEING ABLE TO STOP OR CONTROL WORRYING: NEARLY EVERY DAY
5. BEING SO RESTLESS THAT IT IS HARD TO SIT STILL: MORE THAN HALF THE DAYS
IF YOU CHECKED OFF ANY PROBLEMS ON THIS QUESTIONNAIRE, HOW DIFFICULT HAVE THESE PROBLEMS MADE IT FOR YOU TO DO YOUR WORK, TAKE CARE OF THINGS AT HOME, OR GET ALONG WITH OTHER PEOPLE: VERY DIFFICULT
GAD7 TOTAL SCORE: 19
7. FEELING AFRAID AS IF SOMETHING AWFUL MIGHT HAPPEN: NEARLY EVERY DAY
4. TROUBLE RELAXING: MORE THAN HALF THE DAYS
1. FEELING NERVOUS, ANXIOUS, OR ON EDGE: NEARLY EVERY DAY
3. WORRYING TOO MUCH ABOUT DIFFERENT THINGS: NEARLY EVERY DAY
6. BECOMING EASILY ANNOYED OR IRRITABLE: NEARLY EVERY DAY

## 2021-06-08 ASSESSMENT — MIFFLIN-ST. JEOR: SCORE: 1290.56

## 2021-06-08 ASSESSMENT — PATIENT HEALTH QUESTIONNAIRE - PHQ9: SUM OF ALL RESPONSES TO PHQ QUESTIONS 1-9: 18

## 2021-06-12 ENCOUNTER — OFFICE VISIT - HEALTHEAST (OUTPATIENT)
Dept: FAMILY MEDICINE | Facility: CLINIC | Age: 36
End: 2021-06-12

## 2021-06-12 ENCOUNTER — COMMUNICATION - HEALTHEAST (OUTPATIENT)
Dept: SCHEDULING | Facility: CLINIC | Age: 36
End: 2021-06-12

## 2021-06-12 DIAGNOSIS — L25.9 CONTACT DERMATITIS, UNSPECIFIED CONTACT DERMATITIS TYPE, UNSPECIFIED TRIGGER: ICD-10-CM

## 2021-06-12 DIAGNOSIS — L08.9 SKIN INFECTION: ICD-10-CM

## 2021-06-12 RX ORDER — DOXYCYCLINE HYCLATE 100 MG
100 TABLET ORAL 2 TIMES DAILY
Qty: 20 TABLET | Refills: 0 | Status: SHIPPED | OUTPATIENT
Start: 2021-06-12 | End: 2021-06-22

## 2021-06-16 ENCOUNTER — OFFICE VISIT - HEALTHEAST (OUTPATIENT)
Dept: INTERNAL MEDICINE | Facility: CLINIC | Age: 36
End: 2021-06-16

## 2021-06-16 ENCOUNTER — COMMUNICATION - HEALTHEAST (OUTPATIENT)
Dept: INTERNAL MEDICINE | Facility: CLINIC | Age: 36
End: 2021-06-16

## 2021-06-16 DIAGNOSIS — F32.1 MODERATE MAJOR DEPRESSION (H): ICD-10-CM

## 2021-06-16 DIAGNOSIS — L30.9 DERMATITIS: ICD-10-CM

## 2021-06-16 DIAGNOSIS — L08.9 SKIN INFECTION: ICD-10-CM

## 2021-06-16 RX ORDER — TRIAMCINOLONE ACETONIDE 1 MG/G
CREAM TOPICAL 2 TIMES DAILY
Qty: 453.6 G | Refills: 1 | Status: SHIPPED | OUTPATIENT
Start: 2021-06-16 | End: 2024-02-23

## 2021-06-16 RX ORDER — PREDNISONE 20 MG/1
20 TABLET ORAL DAILY
Qty: 10 TABLET | Refills: 0 | Status: SHIPPED | OUTPATIENT
Start: 2021-06-16 | End: 2021-06-26

## 2021-06-16 RX ORDER — MUPIROCIN 20 MG/G
OINTMENT TOPICAL 3 TIMES DAILY
Qty: 60 G | Refills: 1 | Status: SHIPPED | OUTPATIENT
Start: 2021-06-16 | End: 2024-02-23

## 2021-06-17 ENCOUNTER — COMMUNICATION - HEALTHEAST (OUTPATIENT)
Dept: INTERNAL MEDICINE | Facility: CLINIC | Age: 36
End: 2021-06-17

## 2021-06-17 RX ORDER — PREDNISONE 20 MG/1
TABLET ORAL
Qty: 10 TABLET | Refills: 0 | Status: SHIPPED
Start: 2021-06-17 | End: 2024-02-23

## 2021-06-17 NOTE — PROGRESS NOTES
[facility] Follow Up Note    Assessment/Plan:    1. Anxiety and depression  We will try a small dose of Lexapro.  Side effects were discussed.  If there is problems with insurance coverage of side effects we can try Zoloft since her mom tolerated this drug well.  I gave her prescription for Ativan.  She is aware that this medication is only to take in the emergency's and not on daily basis.  She was also interested in seeing a psychologist and referral was provided.  - escitalopram oxalate (LEXAPRO) 5 MG tablet; 1/2 tab by mouth daily for 1 week, then full tab daily  Dispense: 30 tablet; Refill: 2  - LORazepam (ATIVAN) 0.5 MG tablet; Take 1 tablet (0.5 mg total) by mouth daily.  Dispense: 15 tablet; Refill: 0  - Ambulatory referral to Psychology    2.  IBS, diarrhea predominant.  Symptoms are usually worse when she is anxious and depressed.  Discussed that SSRI potentially can make diarrhea worse and she will let me know if symptoms recur.  She is on probiotics.      Lamar Gabriel MD    Chief Complaint:  Chief Complaint   Patient presents with     [facility]     Anxiety     Depression     Immune system       History of Present Illness:  Anabell is a 32 y.o. female with history of long-standing depression anxiety and IBS as well as intermittent asthma,  who is currently here to meet me for the first time and address her worsening symptoms of depression and anxiety.    Patient reports that she has been suffering from depression and anxiety since high school.  She is always treated with alternative medicine: Acupuncture, massage, essential oils and meditation.  She has never been on medication for it.  She did have bad exacerbation of her depression postpartum and also after she stopped breast-feeding.  She also suffers from anxiety.  When she is anxious she tends to be hyper and doing a lot of cleaning but denies any sleep disc eruption or unrealistic plans and grandiose ideas.  No clear  lena or hypomania symptoms.  She denies any family history of bipolar disorder.  Usually depression symptoms get worse when she is sick and was a young son she has had several upper respiratory infections in the last years..  Most recently she also had IUD taking out by her gynecologist and has improved diet.  Currently she is using natural birth control but would like to get pregnant again at the end of this year when she is done with her school.    She also suffers from irritable bowel syndrome which is prone to diarrhea.  She does take probiotics and avoids dairy and excessive carbohydrates.  Most recently she had significant right lower quadrant abdominal pain and was seen in the emergency room.  CBC and CMP were normal.  CT scan showed a ruptured ovarian cyst and normal appendix.    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       Past History: Viewed  Current Outpatient Prescriptions   Medication Sig Dispense Refill     ASCORBIC ACID/MULTIVIT-MIN (EMERGEN-C ORAL) Take by mouth.       b complex vitamins tablet Take 1 tablet by mouth daily.       cholecalciferol, vitamin D3, (VITAMIN D3) 2,000 unit capsule Take 1,000 Units by mouth daily.       Lacto.acidophilus-Bif.animalis (PROBIOTIC) 5 billion cell CpSP Take by mouth.       escitalopram oxalate (LEXAPRO) 5 MG tablet 1/2 tab by mouth daily for 1 week, then full tab daily 30 tablet 2     LORazepam (ATIVAN) 0.5 MG tablet Take 1 tablet (0.5 mg total) by mouth daily. 15 tablet 0     No current facility-administered medications for this visit.        Family History: Mom  "suffers from significant depression and anxiety.  She is currently managed with Zoloft and likes that medication.    Physical Exam:    Vitals:    04/03/18 1414   BP: 100/60   Patient Site: Left Arm   Patient Position: Sitting   Cuff Size: Adult Regular   Pulse: 92   Resp: 16   SpO2: 100%   Weight: 117 lb (53.1 kg)   Height: 5' 0.75\" (1.543 m)     Wt Readings from Last 3 Encounters:   04/03/18 117 lb (53.1 kg)   11/06/15 161 lb 8 oz (73.3 kg)     Body mass index is 22.29 kg/(m^2).    Constitutional:  Reveals a pleasant female.  Vitals:  Per nursing notes.  HEENT:No cervical LAD, no thyromegaly,  conjunctiva is pink, no scleral icterus, TMs are visualized and normal bl, oropharynx is clear, no exudates,   Cardiac:  Regular rate and rhythm,no murmurs, rubs, or gallops. Legs without edema. Palpation of the radial pulse regular.  Lungs: Clear to auscultation bl.  Respiratory effort normal.  Abdomen:positive BS, soft, nontender, nondistended.  No hepato-splenomagaly  Neurologic:  Cranial nerves II-XII intact.     Psychiatric: affect appropriate, memory intact.  No flight of ideas or pressured speech.  Mild to moderate anxiety noted.    Data Review:    Analysis and Summary of Old Records (2): Yes care everywhere    Records Requested (1): No    Other History Summarized (from other people in the room) (2): No    Radiology Tests Summarized (XRAY/CT/MRI/DXA) (1): Yes abdominal CT    Labs Reviewed (1): Yes    Medicine Tests Reviewed (EKG/ECHO/COLONOSCOPY/EGD) (1): No    Independent Review of EKG or X-RAY (2): No    "
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
## 2021-06-18 NOTE — PROGRESS NOTES
Mental Health Visit Note    6/21/2018    Start time: 4:00    Stop Time: 4:50   Session # 2    Anabell Brumfield is a 33 y.o. female is being seen today for a follow-up psychotherapy appointment    Chief Complaint   Patient presents with      Follow Up   .     New symptoms or complaints:  compulsive and addictive behaviors, communication probs with     Functional Impairment:   The patient identifies the how daily stressors affect daily functioning in today's session as follows (Scale is 1-4, 1=not at all or rarely; 4=extremely so/everyday.)    Personal: 3   Family: 2  Social: 3  Work: 1    Clinical assessment of mental status:   Anabell Brumfield presented on time.   She was oriented x3, open and cooperative, and dressed appropriately for this session and weather. Her memory was Normal cognitive functioning .  Her speech was  Within normal.  Language was normal.  Concentration and focus is Within normal. Psychosis is not noted or reported. She reports her mood is Congruent w/content of speech.  Affect is congruent with speech and is Congruent w/content of speech.  Fund of knowledge is adequate. Insight is adequate for therapy.     Suicidal/Homicidal Ideation present:   No,  Patient denies suicidal and homicidal ideations/means or plans.      Patient's impression of their current status:  Pt concerned over conflicts in marriage and feeling lack of companionship, and insecure about her parenting. Pt reports  can be harsh and doesn't understand depression. Discussed ways of improving communication with him.    Trying to find alt ways of creating excitement and dealing with loneliness. Pt will read SLAA book and think about attending women only SLAA meeting.    Therapist impression of patient's current state:   Anabell Brumfield presents with anxiety and some depression and addictive behaviors. Working on finding ways of living according to values, getting more in touch with feelings and diminishing addictive  behaviors. Making progress with mindfulness and completed Dissecting the problem ident neg thinking. Has been SA free past week and will complete 3 cirlce assignment for next week.     Type of psychotherapeutic technique provided:   Client centered and CBT    Progress toward short term goals:Progress as expected, pt attended follow up to build goals and work on issues    Review of long term goals: Long-term goals reviewed this date. see tx plan . Date of last review 6/21/2018  .    Diagnosis:   1. Major depressive disorder, recurrent episode, moderate with anxious distress (H)    2. SYD (generalized anxiety disorder)    Improving,     Plan and Follow-up:   Patient will follow safety plan of seeking help from friend/family, calling [facility], calling 911, and/or presenting to the ED if necessary.  Patient will be compliant with medications and attend appointments as scheduled.  Pt will work on informal mindfulness, ACT how she'd like partner to remember her in 10 yrs, Cristobal book and SLAA book  Complete 3 [place] assignment    Discharge Criteria/Planning: Patient will continue with follow-up until therapy can be discontinued without return of signs and symptoms.    Signatures:   Performed and documented by Jon Vallejo, SHERICE, LICSW, LADC          This note was created with help of Dragon dictation software. Grammatical / typing errors are not intentional and inherent to the software.

## 2021-06-18 NOTE — PROGRESS NOTES
[facility] Follow Up Note    Assessment/Plan:  1. Anxiety and depression  Symptoms are slightly better based on PHQ 9 and lauren 7 questionnaire.  Patient just started medication 2 weeks ago.  Symptoms are still moderate.  I recommended that she increase his Lexapro to 5 mg a day.  She is considering to conceive another child in a couple of months.    2. Fatigue and low blood pressure  Suspect the low blood pressure is triggering fatigue.  Patient does have 5 bowel movements a day which is soft and not watery, due to IBS.  She drinks 3 bottles of water a day.  Discussed that she needs to drink more Gatorade.  She reports recent thyroid level checks being normal in November.    Lamar Gabriel MD    Chief Complaint:  Chief Complaint   Patient presents with     Follow-up       History of Present Illness:  Anabell is a 33 y.o. female  with history of long-standing depression anxiety and IBS as well as intermittent asthma,  who is currently here for follow-up.    Due to symptoms of depression and anxiety was started her on Lexapro.  Patient just filled the prescription 2 weeks ago and currently is taking half tablet a day (2.5 mg).  The day after she started taking this medication she reports significant improvement in suicidal ideation which we discussed with most likely placebo effect.  She is complaining about significant fatigue although she comments that this is chronic for her.  She also has IBS and chronic mild loose stools (usually she has 5 a day.  She sleeps well.  She drinks 3 bottles of water in a day.  In March of this months she had normal CBC, BMP and glucose testing done.  She also had normal thyroid function and prolactin test done at [facility] in 2017 per patient but it did not have the records.  She reports negative testing for celiac disease in .    Does exercise.  She denies any dizziness or syncope.    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       Past History: Reviewed  Current Outpatient Prescriptions   Medication Sig Dispense Refill     ASCORBIC ACID/MULTIVIT-MIN (EMERGEN-C ORAL) Take by mouth.       b complex vitamins tablet Take 1 tablet by mouth daily.       cholecalciferol, vitamin D3, (VITAMIN D3) 2,000 unit capsule Take 1,000 Units by mouth daily.       escitalopram oxalate (LEXAPRO) 5 MG tablet 1/2 tab by mouth daily for 1 week, then full tab daily 30 tablet 2     Lacto.acidophilus-Bif.animalis (PROBIOTIC) 5 billion cell CpSP Take by mouth.       LORazepam (ATIVAN) 0.5 MG tablet Take 1 tablet (0.5 mg total) by mouth daily. 15 tablet 0     No current facility-administered medications for this visit.        Physical Exam:    Vitals:    05/16/18 1546   BP: (!) 84/62   Patient Site: Left Arm   Patient Position: Sitting   Cuff Size: Adult Regular   Pulse: 66   Weight: 120 lb 9.6 oz (54.7 kg)     Wt Readings from Last 3 Encounters:   05/16/18 120 lb 9.6 oz (54.7 kg)   04/03/18 117 lb (53.1 kg)   11/06/15 161 lb 8 oz (73.3 kg)     Body mass index is 22.98 kg/(m^2).    Constitutional:  Reveals a pleasant young female.  Vitals:  Per nursing notes.  HEENT:No cervical LAD, no thyromegaly,  conjunctiva is pink, no scleral icterus,   Cardiac:  Regular rate and rhythm,no murmurs, rubs, or gallops.  Legs without edema. Palpation of the radial pulse regular.  Lungs: Clear to auscultation bl.  Respiratory effort normal.  Abdomen:positive BS, soft, mild upper abdominal discomfort due to feeling bloated, nondistended.  No hepato-splenomagaly  Rheumatologic: Normal joints and nails of the  hands.  Neurologic:  Cranial nerves II-XII intact.     Psychiatric: affect appropriate, memory intact.     Data Review:    Analysis and Summary of Old Records (2): Yes    Records Requested (1): *No    Other History Summarized (from other people in the room) (2): No    Radiology Tests Summarized (XRAY/CT/MRI/DXA) (1): No    Labs Reviewed (1): Yes    Medicine Tests Reviewed (EKG/ECHO/COLONOSCOPY/EGD) (1) no    Independent Review of EKG or X-RAY (2): No

## 2021-06-18 NOTE — PROGRESS NOTES
"Mental Health Visit Note    6/7/2018    Start time: 4:00    Stop Time: 4:50   Session # 2    Anabell Brumfield is a 33 y.o. female is being seen today for a follow-up psychotherapy appointment    Chief Complaint   Patient presents with      Follow Up     Anxiety     Depression   .     New symptoms or complaints:  compulsive and addictive behaviors, communication probs with     Functional Impairment:   The patient identifies the how daily stressors affect daily functioning in today's session as follows (Scale is 1-4, 1=not at all or rarely; 4=extremely so/everyday.)    Personal: 3   Family: 2  Social: 3  Work: 1    Clinical assessment of mental status:   Anabell Brumfield presented on time.   She was oriented x3, open and cooperative, and dressed appropriately for this session and weather. Her memory was Normal cognitive functioning .  Her speech was  Within normal.  Language was normal.  Concentration and focus is Within normal. Psychosis is not noted or reported. She reports her mood is Congruent w/content of speech.  Affect is congruent with speech and is Congruent w/content of speech.  Fund of knowledge is adequate. Insight is adequate for therapy.     Suicidal/Homicidal Ideation present:   No,  Patient denies suicidal and homicidal ideations/means or plans.      Patient's impression of their current status:  Pt concerned over SA acting out in open marriage. Creating shame and \"hitting rock bottom\" in relationships outside of marriage. Sees danger in behaviors with addictive hallmarks. Trying to find alt ways of creating excitement and dealing with loneliness. Pt will read SLAA book and think about attending women only SLAA meeting.    Therapist impression of patient's current state:   Anabell Brumfield presents with anxiety and some depression and addictive behaviors. Working on finding ways of living according to values, getting more in touch with feelings and diminishing addictive behaviors.    Type of " psychotherapeutic technique provided:   Client centered and CBT    Progress toward short term goals:Progress as expected, pt attended follow up to build goals and work on issues    Review of long term goals: Long-term goals reviewed this date. see tx plan . Date of last review 6/7/2018  .    Diagnosis:   1. Major depressive disorder, recurrent episode, moderate with anxious distress    2. SYD (generalized anxiety disorder)    Improving,     Plan and Follow-up:   Patient will follow safety plan of seeking help from friend/family, calling [facility], calling 911, and/or presenting to the ED if necessary.  Patient will be compliant with medications and attend appointments as scheduled.  Pt will work on values assignment and purchase and read SLAA book    Discharge Criteria/Planning: Patient will continue with follow-up until therapy can be discontinued without return of signs and symptoms.    Signatures:   Performed and documented by SHERICE Bustamante, LICSW, [facility]          This note was created with help of Dragon dictation software. Grammatical / typing errors are not intentional and inherent to the software.

## 2021-06-18 NOTE — PROGRESS NOTES
Outpatient Mental Health Treatment Plan    Name:  Anabell Brumfield  :  1985  MRN:  [id number]    Treatment Plan:  Initial Treatment Plan  Intake/initial treatment plan date:  2018    Benefit and risks and alternatives have been discussed: Yes  Is this treatment appropriate with minimal intrusion/restrictions: Yes  Estimated duration of treatment:  Initial trial of 12 sessions, to be reviewed.  Anticipated frequency of services:  Every 2 weeks  Necessity for frequency: This frequency is needed to establish therapeutic goals and for continuity of care in order to monitor progress.  Necessity for treatment: To address cognitive, behavioral, and/or emotional barriers in order to work toward goals and to improve quality of life.      Plan:      ? Depression    Goal:  Decrease average depression level from 8 to 5.   Strategies:    ?[x] Decrease social isolation     [x] Increase involvement in meaningful activities     ?[x] Discuss sleep hygiene     ?[x] Explore thoughts and expectations about self and others     ?[x] Process grief (loss of significant person, independence, role,        etc.)     ?[x] Assess for suicide risk     ?[x] Implement physical activity routine (with physician approval)     [x] Utilize reading materials provided by psychotherapist     [x] Continue compliance with medical treatment plan (or explore         barriers)       ?     Degree to which this is a problem (1-4): 3   Degree to which goal is met (1-4)1  Date of Review:          ?   ? Anxiety    Goal:  Decrease average anxiety level from 8 to 5.   Strategies: ? [x]Learn and practice relaxation techniques and other coping        strategies (e.g., thought stopping, reframing, meditation)     ? [x] Increase involvement in meaningful activities     ? [x] Discuss sleep hygiene     ? [x] Explore thoughts and expectations about self and others     ? [x] Identify and monitor triggers for panic/anxiety symptoms     ? [x] Implement physical  activity routine (with physician approval)     ? [x] Consider introduction of bibliotherapy and/or videos     ? [x] Continue compliance with medical treatment plan (or explore          barriers)                                         Degree to which this is a problem (1-4): 3   Degree to which goal is met (1-4)1  Date of Review:       Interpersonal stress    Goal:  Increase % satisfaction with relationships from 50 to 60.  Strategies: ?[x] Learn assertive communication skills through role-play and             other techniques     ?[x]  Explore thoughts and expectations about self and others      ?[x]  Learn and practice relaxation techniques and other coping         strategies     [] Consider inviting others to attend conjoint sessions, and/or          consider referral for couples  or family therapy     ?[x] Explore readings provided by psychotherapist     ?    Degree to which this is a problem (1-4): 4   Degree to which goal is met (1-4)1  Date of Review      Functional Impairment: 1=Not at all/Rarely  2=Some days  3=Most Days  4=Every Day   Personal: 2  Family: 3  Social: 2  Work: 1    Diagnosis:   Major depressive disorder, recurrent, moderate with anxious distress; Generalized Anxiety Disorder, History of PPD    WHODAS 2.0 12-item version= 3% no problem  H1= 0  H2= 0  H3= 0  Clinical assessments completed: SYD-7, PHQ-9, Mood Questionnaire current, CAGE-AID, PANSI.      Strengths:  Strengths/personal resources (what does the patient do well, what is going well in life, positive personality characteristics):  Patient reports that she is hard-working, good caretaker, empathic, creative and a good listener.     Weaknesses (what does patient identify as a weakness):  She reports she has trouble learning, reading, talking with others, focusing, and being really physical and in touch with her body.  She also reports that she does not think she has a strong IQ    Cultural Considerations:  Patient attends individual  therapy with self determination to feel better.  Patient uses [facility] and has health insurance and is able to navigate the system.        Persons responsible for this plan:  ? [x] Patient ? [x] Provider ? [] Other: __________________      Provider:Performed and documented by SHERICE Lowe- LICSW; [facility] 6/21/2018    Date:  6/21/2018  Time:  2:13 PM        Patient Signature:____________________________________ Date: ______________       Therapist Signature: __________________________________ Date: ______________

## 2021-06-19 NOTE — PROGRESS NOTES
Mental Health Visit Note    7/16/2018    Start time: 11:00    Stop Time: 11:50   Session # 3    Anabell Brumfield is a 33 y.o. female is being seen today for a follow-up psychotherapy appointment    Chief Complaint   Patient presents with      Follow Up   .     New symptoms or complaints:  Depression sx.     Functional Impairment:   The patient identifies the how daily stressors affect daily functioning in today's session as follows (Scale is 1-4, 1=not at all or rarely; 4=extremely so/everyday.)    Personal: 3   Family: 2  Social: 3  Work: 1    Clinical assessment of mental status:   Anabell Brumfield presented on time.  No change since last session on 6/21/2018.  She was oriented x3, open and cooperative, and dressed appropriately for this session and weather. Her memory was Normal cognitive functioning .  Her speech was  Within normal.  Language was normal.  Concentration and focus is Within normal. Psychosis is not noted or reported. She reports her mood is Congruent w/content of speech.  Affect is congruent with speech and is Congruent w/content of speech.  Fund of knowledge is adequate. Insight is adequate for therapy.     Suicidal/Homicidal Ideation present:   No,  Patient denies suicidal and homicidal ideations/means or plans.      Patient's impression of their current status:  Pt is working on SA issues and has not engaged in any addictive bottom line behaviors since last session. She has bought [employer] book and is enjoying it, resonates with stories, also reading for pleasure and finds herself becoming more centered and slowing down and less impulsive. Avoiding sexting and examining attitudes toward sex and men. Did not complete assignments because they brought up too many uncomfortable emotions in her. She did well avoiding addictive behaviors when her  was out of town. Managing alcohol use and reports greater sense of integrity while still struggling with waves of depression. Read about PPD and learned  that it could flare up 3 yrs after pregnancy and read that having 2nd child could help with depression. Now thinking of having another child to lessen depression. Will discuss pros and cons of that at next session. Not ready to attend an SLAA meeting yet.      Therapist impression of patient's current state:   Anabell Brumfield presents with anxiety and some depression and addictive behaviors. Working on finding ways of living according to values, getting more in touch with feelings and diminishing addictive behaviors. Making progress with mindfulness and completed [employer] meditation. Has been SA free past 2-3 week and worked lightly on 3 cirlce assignment for this week. Changing behaviors in order to bring actions in line with values. Coping more thoughtfully with stressors and cravings. Having hard time accepting depression as a physiological not moral issue and wants to get off meds and thinks having another child may help her to become less depressed. Will cont to work on behavioral changes that can improve mood and outlook.     Type of psychotherapeutic technique provided:   Client centered and CBT    Progress toward short term goals:Progress as expected, pt attended follow up to build goals and work on issues    Review of long term goals: Long-term goals reviewed this date. see tx plan . Date of last review 7/16/2018  .    Diagnosis:   1. Major depressive disorder, recurrent episode, moderate with anxious distress (H)    2. SYD (generalized anxiety disorder)    Improving,     Plan and Follow-up:   Patient will follow safety plan of seeking help from friend/family, calling [facility] crisis, calling 911, and/or presenting to the ED if necessary.  Patient will be compliant with medications and attend appointments as scheduled.  Pt will work on informal mindfulness, ACT how she'd like partner to remember her in 10 yrs,   Cont to work on 3 [place] assignment  Cont to read SLAA book  Complete Values and LIfe  compass assignments  Discharge Criteria/Planning: Patient will continue with follow-up until therapy can be discontinued without return of signs and symptoms.    Signatures:   Performed and documented by Jon Vallejo, SHERICE, LICSW, LADC          This note was created with help of Dragon dictation software. Grammatical / typing errors are not intentional and inherent to the software.

## 2021-06-19 NOTE — PROGRESS NOTES
[facility] Follow Up Note    Assessment/Plan:    1. Fatigue, unspecified type  Could be multifactorial.  Certainly moderate depression and anxiety can be contributing however I am also concerned about sleep disturbance and possible apnea or narcolepsy.  She is agreed to see a specialist at the sleep clinic.  We will do metabolic workup and also evaluate for possible pregnancy today.  - Thyroid Stimulating Hormone (TSH)  - 1(CBC and Differential)  - Basic Metabolic Panel  - Vitamin D, Total (25-Hydroxy)  - Vitamin B12  - Folate, Serum  - Beta-hCG, Qualitative, Serum  - Ambulatory referral to Sleep Medicine  - [facility] (CBC with Diff)    2. Mild persistent asthma with exacerbation  No wheezing or shortness of breath on exam.  Albuterol was refilled.  - albuterol (PROAIR HFA;PROVENTIL HFA;VENTOLIN HFA) 90 mcg/actuation inhaler; Inhale 2 puffs every 6 (six) hours as needed for wheezing.  Dispense: 1 each; Refill: 1  - Ambulatory referral to Sleep Medicine    3. Anxiety and depression  Lexapro made her gain weight.  Currently she is followed by psychologist.  She will try acupuncture for depression and anxiety.  We also discussed the possibility of trial of Wellbutrin or Effexor.  She will let me know if she is ready to try it    4. Abnormal REM sleep  Since childhood.  No family history of sleep apnea  - Ambulatory referral to Sleep Medicine    5. SOB (shortness of breath)  At night most likely due to asthma, no murmurs on exam.  Will check BNP just in case.  No lower extremity edema.  - BNP(B-type Natriuretic Peptide)    Lamar Gabriel MD    Chief Complaint:  Chief Complaint   Patient presents with     Fatigue     Asthma     Medication Management     Depression - PHQ 9 = 17       History of Present Illness:  Anabell is a 33 y.o. female with history of long-standing depression anxiety and IBS as well as intermittent asthma,  who is currently here for follow-up.    Currently patient continues to  complain about significant fatigue.  That is a chronic condition.  She reports that she has had problems with sleep since she was a child.  She usually requires a lot of sleep at night and would still does off during the day.  Frequently she falls asleep at the wheel and in the past would always fall asleep during her classes.  At work she frequently has to lie down and take a couple minute nap.  She denies any snoring but has terrible dreams.  She has no problems falling asleep.  We discussed referral to sleep clinic to see whether she might have sleep apnea or narcolepsy.    Patient does have asthma.  Most recently control his gotten worse.  She is wondering if she is pregnant because during pregnancy her shortness of breath has gotten much worse at night where she would feel pressure.  Currently asthma has worsened in the last week but she denies any cough.  No lower extremity edema.  She has no murmurs on exam.  I refilled her albuterol and will follow-up to see if she needs a steroid inhaler.    In the last couple of weeks her and her  have been trying to conceive and she reports that she might be pregnant or she might have PMS.  Her last menstrual period was last month.  She is not on birth control.  She is on prenatal vitamin.    She continues to have moderately severe depression and anxiety.  In the past I have given her Lexapro but it make her gain significant amount of weight.  Currently she is followed without psychologist Jon.  She feels the depression is worse and anxiety.  PHQ 9 score today is 17 and lauren 7 score is 17.  She works as an acupuncturist herself and I recommended that she also tried acupuncture for depression and anxiety.  We discussed weight neutral antidepressants including Wellbutrin and Effexor.  Currently patient would like to wait to get on it.    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       Past History: Reviewed  Current Outpatient Prescriptions   Medication Sig Dispense Refill     albuterol (PROAIR HFA;PROVENTIL HFA;VENTOLIN HFA) 90 mcg/actuation inhaler Inhale 2 puffs every 6 (six) hours as needed for wheezing. 1 each 1     ASCORBIC ACID/MULTIVIT-MIN (EMERGEN-C ORAL) Take by mouth.       b complex vitamins tablet Take 1 tablet by mouth daily.       cholecalciferol, vitamin D3, (VITAMIN D3) 2,000 unit capsule Take 1,000 Units by mouth daily.       Lacto.acidophilus-Bif.animalis (PROBIOTIC) 5 billion cell CpSP Take by mouth.       LORazepam (ATIVAN) 0.5 MG tablet Take 1 tablet (0.5 mg total) by mouth daily. 15 tablet 0     No current facility-administered medications for this visit.        Family History: Reviewed    Physical Exam:    Vitals:    08/06/18 1431   BP: 110/70   Patient Site: Left Arm   Patient Position: Sitting   Cuff Size: Adult Regular   Pulse: 74   Resp: 16   Weight: 126 lb 9 oz (57.4 kg)   Height: 5' 0.75\" (1.543 m)     Wt Readings from Last 3 Encounters:   08/06/18 126 lb 9 oz (57.4 kg)   05/16/18 120 lb 9.6 oz (54.7 kg)   04/03/18 117 lb (53.1 kg)     Body mass index is 24.11 kg/(m^2).    Constitutional:  Reveals a pleasant young female.  Vitals:  Per nursing notes.  HEENT:No cervical LAD, no thyromegaly,  conjunctiva is pink, no scleral icterus, TMs are visualized and normal bl, oropharynx is clear, no exudates,   Cardiac:  Regular rate and rhythm,no murmurs, rubs, or gallops. Carotids without bruits. Legs without edema. Palpation of the radial pulse regular.  Lungs: Clear to auscultation bl.  Respiratory effort normal.  No wheezes or rales  Abdomen:positive BS, " soft, nontender, nondistended.  No hepato-splenomagaly  Skin:   Without rash, bruise, or palpable lesions.  Rheumatologic: Normal joints and nails of the hands.  Neurologic:  Cranial nerves II-XII intact.     Psychiatric: affect appropriate, memory intact.     Data Review:    Analysis and Summary of Old Records (2): Yes    Records Requested (1): No    Other History Summarized (from other people in the room) (2): No    Radiology Tests Summarized (XRAY/CT/MRI/DXA) (1): No    Labs Reviewed (1): Yes    Medicine Tests Reviewed (EKG/ECHO/COLONOSCOPY/EGD) (1): No    Independent Review of EKG or X-RAY (2): No

## 2021-06-19 NOTE — PROGRESS NOTES
"Mental Health Visit Note    7/30/2018    Start time: 11:00    Stop Time: 11:50   Session # 4    Anabell Brumfield is a 33 y.o. female is being seen today for a follow-up psychotherapy appointment    Chief Complaint   Patient presents with     MH Follow Up     Depression     Anxiety   .     New symptoms or complaints:  none    Functional Impairment:   The patient identifies the how daily stressors affect daily functioning in today's session as follows (Scale is 1-4, 1=not at all or rarely; 4=extremely so/everyday.)    Personal: 3   Family: 2  Social: 3  Work: 1    Clinical assessment of mental status:   Anabell Brumfield presented on time.  No change since last session on 7/16/2018.  She was oriented x3, open and cooperative, and dressed appropriately for this session and weather. Her memory was Normal cognitive functioning .  Her speech was  Within normal.  Language was normal.  Concentration and focus is Within normal. Psychosis is not noted or reported. She reports her mood is Congruent w/content of speech.  Affect is congruent with speech and is Congruent w/content of speech.  Fund of knowledge is adequate. Insight is adequate for therapy.     Suicidal/Homicidal Ideation present:   No,  Patient denies suicidal and homicidal ideations/means or plans.      Patient's impression of their current status:  Pt is working on improving mental health. She reports discont SSRI due to weight gain and desire to manage MH without meds. Reports no neg side effects from discont meds aside from feeling a bit more scattered and less focused. She reports able to deal with being less focused by working out, meditation, diet and attentiveness. She reports feeling uncomfortable needing to take meds for depression and prefers behavioral strategies. She is also thinking of having a second child but is aware that could increase stress and lead to repeat of PPD. She wants to have a spring baby and has \"baby fever\" feeling clotilde at the signt of " "babies and a yearning for that in her life. Completed values and life compass assignments and reports persistence, self development, compassion, gratitude, generosity, curiosity, industrious nose are most important values to her.  She reports parenting personal growth leisure work intimate relationships are most important on her life Compas with social relationships community and environment and health at 8.  She rated spirituality low at 3 but then indicated that spirituality for her was the connection that she felt to people when she had deep conversations are felt loved further or the connection she fell with nature and life and her connection to the world.  She ranges her parenting at a 9 on the 10 point scale work it is 6 on a 10 leisure and a one though she thinks it is 10 in terms of importance.  She is nonetheless active with  bicycling taking her son out for bike rides attending the gym and yoga classes meditating somewhat so she is probably doing better and leisure than she initially thought.  She thinks in terms of parenting she needs to be more calm and patient yet developed boundaries and \"love more.  She wants to become more at peace and more calm and continue learning more about Chinese medicine in terms of personal growth and more.  Was to be greener in terms of the environment and work on finding the balance of healthy relationships in her social relationships she says she is at about a 7 in terms of intimate relationships but finds that it is very hard because to a street she does not always feel appreciated.   has not engaged in any addictive bottom line behaviors since last session. She has bought [employer] book and is enjoying it, resonates with stories, also reading for pleasure and finds herself becoming more centered and slowing down and less impulsive. Avoiding sexting and examining attitudes toward sex and men. Did not complete assignments because they brought up too many uncomfortable " emotions in her. She did well avoiding addictive behaviors when her  was out of town. Managing alcohol use and reports greater sense of integrity while still struggling with waves of depression. Read about PPD and learned that it could flare up 3 yrs after pregnancy and read that having 2nd child could help with depression. Now thinking of having another child to lessen depression. Will discuss pros and cons of that at next session. Not ready to attend an [employer] meeting yet.      Therapist impression of patient's current state:   Anabell Brumfield presents with anxiety and some depression and addictive behaviors. clarified values, getting more in touch with feelings and diminishing addictive behaviors. Making progress with mindfulness and completed life compass assign  Has been SA free past 4 week. Will speak with PCP about stopping SSRI. Changing behaviors in order to bring actions in line with values. Coping more thoughtfully with stressors and cravings.Will cont to work on behavioral changes that can improve mood and outlook.     Type of psychotherapeutic technique provided:   Client centered and CBT    Progress toward short term goals:Progress as expected, pt attended follow up to build goals and work on issues    Review of long term goals: Long-term goals reviewed this date. see tx plan . Date of last review 7/30/2018  .    Diagnosis:   1. Major depressive disorder, recurrent episode, moderate with anxious distress (H)    2. SYD (generalized anxiety disorder)    Improving,     Plan and Follow-up:   Patient will follow safety plan of seeking help from friend/family, calling [facility] crisis, calling 911, and/or presenting to the ED if necessary.  Patient will be compliant with medications and attend appointments as scheduled.  Pt will work on informal mindfulness, ACT how she'd like partner to remember her in 10 yrs,   Cont to work on 3 [place] assignment  Cont to read SL[employer] book  Complete [employer] self  confidence assignment  Discharge Criteria/Planning: Patient will continue with follow-up until therapy can be discontinued without return of signs and symptoms.    Signatures:   Performed and documented by Jon Vallejo, SHERICE, LICAYSIR, DIANAC

## 2021-06-19 NOTE — PROGRESS NOTES
Mental Health Visit Note    8/15/2018    Start time: 12:00    Stop Time: 12:50   Session # 6    Anabell Brumfield is a 33 y.o. female is being seen today for a follow-up psychotherapy appointment    Chief Complaint   Patient presents with      Follow Up   .     New symptoms or complaints:  Depression and anxiety and fatigue    Functional Impairment:   The patient identifies the how daily stressors affect daily functioning in today's session as follows (Scale is 1-4, 1=not at all or rarely; 4=extremely so/everyday.)    Personal: 3   Family: 2  Social: 3  Work: 2    Clinical assessment of mental status:   Anabell Brumfield presented on time.  No change since last session on 7/30/2018.  She was oriented x3, open and cooperative, and dressed appropriately for this session and weather. Her memory was Normal cognitive functioning .  Her speech was  Within normal.  Language was normal.  Concentration and focus is Within normal. Psychosis is not noted or reported. She reports her mood is Congruent w/content of speech.  Affect is congruent with speech and is Congruent w/content of speech.  Fund of knowledge is adequate. Insight is adequate for therapy.     Suicidal/Homicidal Ideation present:   No,  Patient denies suicidal and homicidal ideations/means or plans.      Patient's impression of their current status:  Pt is working on improving mental health.  She will take psychodelic with friend tomorrow after reading Graham Pollen book to increase insight and awareness. Pt reported using CBD oil last week and feeling better able to communicate honestly. She is seeking to expand awareness and using chemicals to do so. Discussed pros and cons of changing mood and perception through substance use. Discussed natural ways of increasing awareness and insight including meditation, exercise, socialization, art, etc.   Therapist impression of patient's current state:   Anabell Brumfield presents with anxiety and some depression and  addictive behaviors. clarified values, getting more in touch with feelings and diminishing addictive behaviors. Making progress with mindfulness and completed life compass assign  Has been SA free past 6 week.  Pt working on communicating more effectively. Wanted to share less with others but had experience where she disclosed more last week and felt it was appropriate and helpful.  Will cont to work on behavioral changes that can improve mood and outlook.     Type of psychotherapeutic technique provided:   Client centered and CBT    Progress toward short term goals:Progress as expected, pt attended follow up to build goals and work on issues    Review of long term goals: Long-term goals reviewed this date. see tx plan . Date of last review 8/15/2018  .    Diagnosis:   1. Major depressive disorder, recurrent episode, moderate with anxious distress (H)    2. SYD (generalized anxiety disorder)    Improving,     Plan and Follow-up:   Patient will follow safety plan of seeking help from friend/family, calling [facility], calling 911, and/or presenting to the ED if necessary.  Patient will be compliant with medications and attend appointments as scheduled.  Pt will work on informal mindfulness, ACT how she'd like partner to remember her in 10 yrs,   Cont to read [employer] book  Complete meditation and for goal assignment   Discharge Criteria/Planning: Patient will continue with follow-up until therapy can be discontinued without return of signs and symptoms.    Signatures:   Performed and documented by SHERICE Bustamante, LICSW, LADC

## 2021-06-19 NOTE — PROGRESS NOTES
Mental Health Visit Note    8/6/2018    Start time: 1:00    Stop Time: 1:50   Session # 5    Anabell Brumfield is a 33 y.o. female is being seen today for a follow-up psychotherapy appointment    Chief Complaint   Patient presents with      Follow Up   .     New symptoms or complaints:  Depression and anxiety and fatigue    Functional Impairment:   The patient identifies the how daily stressors affect daily functioning in today's session as follows (Scale is 1-4, 1=not at all or rarely; 4=extremely so/everyday.)    Personal: 3   Family: 2  Social: 3  Work: 2    Clinical assessment of mental status:   Anabell Brumfield presented on time.  No change since last session on 7/30/2018.  She was oriented x3, open and cooperative, and dressed appropriately for this session and weather. Her memory was Normal cognitive functioning .  Her speech was  Within normal.  Language was normal.  Concentration and focus is Within normal. Psychosis is not noted or reported. She reports her mood is Congruent w/content of speech.  Affect is congruent with speech and is Congruent w/content of speech.  Fund of knowledge is adequate. Insight is adequate for therapy.     Suicidal/Homicidal Ideation present:   No,  Patient denies suicidal and homicidal ideations/means or plans.      Patient's impression of their current status:  Pt is working on improving mental health.  She reports somewhat worsening depression and anxiety that are related to negative thinking and could be related to her discontinuation of antidepressant medication only a few weeks ago.  She met with her physician this date and discussed medication options a sleep study option and options for treating fatigue and mental health symptoms with acupuncture.  She reports low self-esteem and indicates that negative thinking makes it difficult for her to focus and for her to do her job as well as she could.  She reports she tends to talk too much and that this can make her feel like  "she is less respected and less able to educate others at work.  She will work on becoming more sick [place] and digressing less and being less tangential in her communication.  She was given a goals worksheet and encouraged to look at goals and for areas of life.  She reports her main anxieties relate to work issues and her feelings that she is a fraud at work.  He continues working on having another child to lessen depression. Will discuss pros and cons of that at next session. Not ready to attend an [employer] meeting yet.  Reports as a behavior manageable currently in no acute is more breaking up bottom line boundaries.  She completed if I had limited self-esteem assignment and gained insights from that      Therapist impression of patient's current state:   Anabell Brumfield presents with anxiety and some depression and addictive behaviors. clarified values, getting more in touch with feelings and diminishing addictive behaviors. Making progress with mindfulness and completed life compass assign  Has been SA free past 5 week.  Patient anxiety causes her to share too much during presentations.  Patient anxiety leads her to be overly critical but also sets energy and focus so that she is not able to make practical changes his consistency and she would like.  We did a meditation meeting at the end of session so that patient can practice listening and focusing on silence and \"space between noises.  Patient provided with a meditation print out to practice at home.  Patient will also keep nightly inventory of how well she did in listening more and not over sharing.  Will cont to work on behavioral changes that can improve mood and outlook.     Type of psychotherapeutic technique provided:   Client centered and CBT    Progress toward short term goals:Progress as expected, pt attended follow up to build goals and work on issues    Review of long term goals: Long-term goals reviewed this date. see tx plan . Date of last review " 8/6/2018  .    Diagnosis:   1. Major depressive disorder, recurrent episode, moderate with anxious distress (H)    2. SYD (generalized anxiety disorder)    Improving,     Plan and Follow-up:   Patient will follow safety plan of seeking help from friend/family, calling [facility] crisis, calling 911, and/or presenting to the ED if necessary.  Patient will be compliant with medications and attend appointments as scheduled.  Pt will work on informal mindfulness, ACT how she'd like partner to remember her in 10 yrs,   Cont to work on 3 [place] assignment  Cont to read [employer] book  Complete meditation and for goal assignment   Discharge Criteria/Planning: Patient will continue with follow-up until therapy can be discontinued without return of signs and symptoms.    Signatures:   Performed and documented by Jon Vallejo, SHERICE, LICSW, LADC

## 2021-06-20 NOTE — PROGRESS NOTES
Mental Health Visit Note    9/17/2018    Start time: 5:00    Stop Time: 5:50   Session # 7    Anabell Brumfield is a 33 y.o. female is being seen today for a follow-up psychotherapy appointment    Chief Complaint   Patient presents with      Follow Up   .     New symptoms or complaints:  High anxiety, panic attack on Sunday.     Functional Impairment:   The patient identifies the how daily stressors affect daily functioning in today's session as follows (Scale is 1-4, 1=not at all or rarely; 4=extremely so/everyday.)    Personal: 3   Family: 2  Social: 3  Work: 2    Clinical assessment of mental status:   Anabell Brumfield presented on time.  No change since last session on 8/15/2018.  She was oriented x3, open and cooperative, and dressed appropriately for this session and weather. Her memory was Normal cognitive functioning .  Her speech was  Within normal.  Language was normal.  Concentration and focus is Within normal. Psychosis is not noted or reported. She reports her mood is Congruent w/content of speech.  Affect is congruent with speech and is Congruent w/content of speech.  Fund of knowledge is adequate. Insight is adequate for therapy.     Suicidal/Homicidal Ideation present:   No,  Patient denies suicidal and homicidal ideations/means or plans.      Patient's impression of their current status:  Pt is working on improving mental health.  She took psychodelic with friend 2 weeks ago and found experience enlightening and helpful in gaining self acceptance and living in the moment. Pt reports return of anxiety during the past week with greater concerns about competence at work, pressure from school demands, need to develop a dissertation, family duties, and spending time traveling with friends leading to greter stress and feeling overwhelmed. Returning to some SA behavior by texting men to relieve stress and wants to focus more on alt stress reduction actions.    Discussed natural ways of increasing awareness  and insight including meditation, exercise, socialization, art, etc.     Therapist impression of patient's current state:   Anabell Brumfield presents with anxiety and some depression and addictive behaviors. She can present as detached from difficulties and sometimes reports difficult feelings and situations with a  Half smile and sheepish laugh. Feeling anxiety and self doubt due to lack of focus. Encouraged to clarify values, use meditation, exercise, diet, sleep and getting more in touch with feelings and diminishing addictive behaviors. Making progress with mindfulness and completed life compass assign    Pt working on communicating more effectively. Worries about neg thoughts that arise spontaneously that have her thinking of horrible things like murders of children that create revulsion and fear in her mind.  Processed these disturbing thoughts and she found sharing them helpful to relieve shame and anxiety Will cont to work on behavioral changes that can improve mood and outlook.     Type of psychotherapeutic technique provided:   Client centered and CBT    Progress toward short term goals:Progress as expected, pt attended follow up to build goals and work on issues    Review of long term goals: Long-term goals reviewed this date. see tx plan . Date of last review 9/17/2018  .    Diagnosis:   1. Major depressive disorder, recurrent episode, moderate with anxious distress (H)    2. SYD (generalized anxiety disorder)    Improving,     Plan and Follow-up:   Patient will follow safety plan of seeking help from friend/family, calling [facility] crisis, calling 911, and/or presenting to the ED if necessary.  Patient will be compliant with medications and attend appointments as scheduled.  Pt will work on informal mindfulness, ACT how she'd like partner to remember her in 10 yrs,   Cont to read SLAA book  Complete meditation and 4 goal assignment   Discharge Criteria/Planning: Patient will continue with follow-up until  therapy can be discontinued without return of signs and symptoms.    Signatures:   Performed and documented by Jon Vallejo, SHERICE, LICSW, LADC

## 2021-06-21 NOTE — PROGRESS NOTES
"Mental Health Visit Note    11/27/2018    Start time: 11:00    Stop Time: 11:50   Session # 8    Anabell Brumfield is a 33 y.o. female is being seen today for a follow-up psychotherapy appointment    Chief Complaint   Patient presents with      Follow Up     Depression   .     New symptoms or complaints:  High anxiety, panic attack on Sunday.     Functional Impairment:   The patient identifies the how daily stressors affect daily functioning in today's session as follows (Scale is 1-4, 1=not at all or rarely; 4=extremely so/everyday.)    Personal: 3   Family: 2  Social: 3  Work: 2    Clinical assessment of mental status:   Anabell Brumfield presented on time.  No change since last session on 9/17/2018.  She was oriented x3, open and cooperative, and dressed appropriately for this session and weather. Her memory was Normal cognitive functioning .  Her speech was  Within normal.  Language was normal.  Concentration and focus is Within normal. Psychosis is not noted or reported. She reports her mood is Congruent w/content of speech.  Affect is congruent with speech and is Congruent w/content of speech.  Fund of knowledge is adequate. Insight is adequate for therapy.     Suicidal/Homicidal Ideation present:   No,  Patient denies suicidal and homicidal ideations/means or plans.      Patient's impression of their current status:  Pt is working on improving mental health.  She reports return to therapy due to increase in anxiety and cravings to return to addictive behaviors. Reported previous therapy sessions helped her to manage emotions and put thoughts and feelings in perspective. Having trouble respecting self and disappointed in self despite completing doctorate and working full time. Pt pregnant again and excited but having some depression related to hormonal changes. Reports desire to cont to re engage in therapy to discuss concerns, work 5th step and develop coping skills. Practiced meditation exercise \"wise mind\" " and pt found helpful   Discussed natural ways of increasing awareness and insight including meditation, exercise, socialization, art, etc.     Therapist impression of patient's current state:   Anabell Brumfield presents with increased anxiety and  depression and craving to return to addictive behaviors. She was direct and honest during session focusing on nature of thoughts and feelings and ways of coping with stressors and neg thoughts. Worries about neg thoughts that arise spontaneously that have her thinking of horrible things like murders of children that create revulsion and fear in her mind.  Processed these disturbing thoughts and she found sharing them helpful to relieve shame and anxiety Will cont to work on behavioral changes that can improve mood and outlook.     Type of psychotherapeutic technique provided:   Client centered and CBT    Progress toward short term goals:Progress as expected, pt attended follow up to build goals and work on issues    Review of long term goals: Long-term goals reviewed this date. see tx plan . Date of last review 11/27/2018  .    Diagnosis:   1. Major depressive disorder, recurrent episode, moderate with anxious distress (H)    2. SYD (generalized anxiety disorder)    worsened    Plan and Follow-up:   Patient will follow safety plan of seeking help from friend/family, calling [facility], calling 911, and/or presenting to the ED if necessary.  Patient will be compliant with medications and attend appointments as scheduled.  Pt will work on informal mindfulness, ACT how she'd like partner to remember her in 10 yrs,   Cont to read SLAA book  Complete meditation and 4 goal assignment   Discharge Criteria/Planning: Patient will continue with follow-up until therapy can be discontinued without return of signs and symptoms.    Signatures:   Performed and documented by Jon Vallejo, SHERICE, LICSW, LADC

## 2021-06-24 NOTE — TELEPHONE ENCOUNTER
No valid phone number on file for pt.  Attempted to contact pt's emergency contact, her spouse to get valid phone number.  Voicemail not set up, will attempt again later.  Pt due for asthma quality.  [facility] message had already been sent to pt.

## 2021-06-25 NOTE — TELEPHONE ENCOUNTER
Prednisone taper possibly has a error.  They are concerned about it stating 3 tabs a day after going down to 3 tabs a day.  Transferred call to [facility] in clinic for follow-up.    Mahogany Bahena RN 06/12/21 11:36 AM

## 2021-06-25 NOTE — TELEPHONE ENCOUNTER
RN Triage:   She was seen last week for hives. Hives have gotten worse. Hives are all over the body.   Anxiety causes the hives. She has had them for 4 months under her arms.   She stated they look more like blisters that drain clear yellow fluid. NO difficulty breathing, no swelling of the lips or eyes. She will go into the [facility] today to be seen. Hours and location reviewed.     Nuvia Romano RN, BSN Care Connection Triage Nurse'          COVID 19 Nurse Triage Plan/Patient Instructions    Please be aware that novel coronavirus (COVID-19) may be circulating in the community. If you develop symptoms such as fever, cough, or SOB or if you have concerns about the presence of another infection including coronavirus (COVID-19), please contact your health care provider or visit  [email or web address].    Disposition/Instructions    In-Person Visit with provider recommended. Reference Visit Selection Guide.    Thank you for taking steps to prevent the spread of this virus.  o Limit your contact with others.  o Wear a simple mask to cover your cough.  o Wash your hands well and often.    Resources    [facility]: About COVID-19: [email or web address]    CDC: What to Do If You're Sick: www.cdc.gov/coronavirus/2019-ncov/about/steps-when-sick.html    CDC: Ending Home Isolation: www.cdc.gov/coronavirus/2019-ncov/hcp/disposition-in-home-patients.html     CDC: Caring for Someone: www.cdc.gov/coronavirus/2019-ncov/if-you-are-sick/care-for-someone.html     [facility]: Interim Guidance for Hospital Discharge to Home: www.health.[facility].mn.us/diseases/coronavirus/hcp/hospdischarge.pdf    [facility] clinical trials (COVID-19 research studies): [email or web address]     Below are the COVID-19 hotlines at the [facility] of Health ([facility]). Interpreters are available.   o For health questions: Call 960-597-7936 or 1-848.814.3815 (7 a.m. to 7 p.m.)  For questions about schools and  childcare: Call 116-738-5493 or 1-712.757.7389 (7 a.m. to 7 p.m.)  Reason for Disposition    Hives persist > 1 week    SEVERE itching (i.e., interferes with sleep, normal activities or school)    Additional Information    Negative: [1] Life-threatening reaction (anaphylaxis) in the past to similar substance (e.g., food, insect bite/sting, chemical, etc.) AND [2] < 2 hours since exposure    Negative: Difficulty breathing or wheezing now    Negative: [1] Swollen tongue AND [2] rapid onset    Negative: [1] Hoarseness or cough now AND [2] rapid onset    Negative: Shock suspected (e.g., cold/pale/clammy skin, too weak to stand, low BP, rapid pulse)    Negative: Difficult to awaken or acting confused (e.g., disoriented, slurred speech)    Negative: Sounds like a life-threatening emergency to the triager    Negative: [1] Bee, wasp, or yellow jacket sting AND [2] within last 24 hours    Negative: Blood-colored, dark red or purple rash    Negative: [1] Drug rash suspected AND [2] started taking new medicine within last 2 weeks(Exception: antihistamine, eye drops, ear drops, decongestant or other OTC cough/cold medicines)    Negative: Doesn't match the SYMPTOMS of hives    Negative: Swollen tongue    Negative: [1] Widespread hives, itching or facial swelling AND [2] onset < 2 hours of exposure to high-risk allergen (e.g., sting, nuts, 1st dose of antibiotic)    Negative: Patient sounds very sick or weak to the triager    Negative: [1] Hives from food reaction AND [2] diagnosis never confirmed by a HCP    Negative: [1] Widespread hives, itching or facial swelling AND [2] onset > 2 hours after exposure to high-risk allergen (e.g., sting, nuts, 1st dose of antibiotic)    Negative: [1] MODERATE-SEVERE hives persist (i.e., hives interfere with normal activities or work) AND [2] taking antihistamine (e.g., Benadryl, Claritin) > 24 hours    Negative: [1] Hives have become worse AND [2] taking oral steroids (e.g., prednisone) > 24  hours    Negative: Joint swelling    Negative: [1] Abdominal pain AND [2] pain present > 12 hours    Negative: Fever    Negative: [1] Life-threatening reaction (anaphylaxis) in the past to similar substance (e.g., food, insect bite/sting, chemical, etc.) AND [2] < 2 hours since exposure    Negative: [1] Sudden onset of rash (within last 2 hours) AND [2] difficulty with breathing or swallowing    Negative: Shock suspected (e.g., cold/pale/clammy skin, too weak to stand, low BP, rapid pulse)    Negative: Difficult to awaken or acting confused (e.g., disoriented, slurred speech)    Negative: [1] Purple or blood-colored spots or dots AND [2] fever    Negative: Sounds like a life-threatening emergency to the triager    Negative: Insect bites suspected    Negative: Swimmer's Itch suspected    Negative: Sunburn suspected    Negative: Hives suspected    Negative: Measles suspected AND [2] known exposure to measles in past 3 weeks    Negative: [1] Chickenpox suspected AND [2] known exposure to chickenpox in past 3 weeks    Negative: [1] Drug rash suspected AND [2] started taking new medicine within last 2 weeks(Exception: antihistamine, eye drops, ear drops, decongestant or other OTC cough/cold medicines)    Negative: [1] Widespread rash AND [2] bright red, sunburn-like AND [3] current tampon use or nasal packing    Negative: [1] Widespread rash AND [2] bright red, sunburn-like AND [3] wound infection or recent surgery    Negative: [1] Bright red skin AND [2] peels off in sheets    Negative: Stiff neck (unable to touch chin to chest)    Negative: Fever    Negative: Joint pain or swelling    Negative: Rash looks like large or small blisters (i.e., fluid filled bubbles or sacs on the skin)    Negative: Patient sounds very sick or weak to the triager    Negative: [1] Purple or blood-colored rash (spots or dots) AND [2] no fever AND [3] sounds well to triager    Negative: Sores in mouth    Negative: Face becomes swollen     Negative: [1] Headache AND [2] no fever    Protocols used: HIVES-A-AH, RASH OR REDNESS - WIDESPREAD-A-AH

## 2021-06-25 NOTE — TELEPHONE ENCOUNTER
If she has some extra prednisone to have on hand in case when she stops the prednisone while she is on vacation she will have additional medication to take if her rash flares.  Sorry about the confusion.

## 2021-06-25 NOTE — TELEPHONE ENCOUNTER
Patient at urgent care. Discharged with RX that were filled. Two topical ointments. MD said it would be a tub to apply all over the body. One is 22 grams ointment. Is that the right amount? I confirmed the orders, as written in the visit notes. She has a partial fill of one cream so she knows now to go back to get the rest of it to continue treatment.  Sho Wong RN  [facility]    Reason for Disposition    Caller has medication question, adult has minor symptoms, caller declines triage, AND triager answers question    Additional Information    Negative: Drug overdose and triager unable to answer question    Negative: Caller requesting information unrelated to medicine    Negative: Caller requesting a prescription for Strep throat and has a positive culture result    Negative: Rash while taking a medication or within 3 days of stopping it    Negative: Immunization reaction suspected    Negative: [1] Asthma and [2] having symptoms of asthma (cough, wheezing, etc.)    Negative: [1] Influenza symptoms AND [2] anti-viral med prescription request, such as Tamiflu    Negative: [1] Symptom of illness (e.g., headache, abdominal pain, earache, vomiting) AND [2] more than mild    Negative: MORE THAN A DOUBLE DOSE of a prescription or over-the-counter (OTC) drug    Negative: [1] DOUBLE DOSE (an extra dose or lesser amount) of over-the-counter (OTC) drug AND [2] any symptoms (e.g., dizziness, nausea, pain, sleepiness)    Negative: [1] DOUBLE DOSE (an extra dose or lesser amount) of prescription drug AND [2] any symptoms (e.g., dizziness, nausea, pain, sleepiness)    Negative: Took another person's prescription drug    Negative: [1] DOUBLE DOSE (an extra dose or lesser amount) of prescription drug AND [2] NO symptoms (Exception: a double dose of antibiotics)    Negative: Diabetes drug error or overdose (e.g., took wrong type of insulin or took extra dose)    Negative: [1] Request for URGENT new prescription or  "refill of \"essential\" medication (i.e., likelihood of harm to patient if not taken) AND [2] triager unable to fill per unit policy    Negative: [1] Prescription not at pharmacy AND [2] was prescribed by PCP recently    Negative: [1] Pharmacy calling with prescription questions AND [2] triager unable to answer question    Negative: [1] Caller has URGENT medication question about med that PCP or specialist prescribed AND [2] triager unable to answer question    Negative: [1] Caller has NON-URGENT medication question about med that PCP prescribed AND [2] triager unable to answer question    Negative: [1] Caller requesting a NON-URGENT new prescription or refill AND [2] triager unable to refill per unit policy    Negative: [1] Caller has medication question about med not prescribed by PCP AND [2] triager unable to answer question (e.g., compatibility with other med, storage)    Negative: Caller requesting a CONTROLLED substance prescription refill (e.g., narcotics, ADHD medicines)    Negative: Caller wants to use a complementary or alternative medicine    Negative: [1] Prescription prescribed recently is not at pharmacy AND [2] triager has access to patient's EMR AND [3] prescription is recorded in the EMR    Negative: [1] DOUBLE DOSE (an extra dose or lesser amount) of over-the-counter (OTC) drug AND [2] NO symptoms    Negative: [1] DOUBLE DOSE (an extra dose or lesser amount) of antibiotic drug AND [2] NO symptoms    Negative: Caller has medication question only, adult not sick, and triager answers question    Protocols used: MEDICATION QUESTION CALL-A-AH      "

## 2021-06-25 NOTE — TELEPHONE ENCOUNTER
Reason for Call:  Other call back      Detailed comments: Need to clairfy directions for:  Prednisone 20mg - rx was received with 2 different directions    Phone Number Patient can be reached at: Other phone number:  [phone]    Best Time: anytime    Can we leave a detailed message on this number?: Yes    Call taken on 6/16/2021 at 8:09 AM by Sara Pickering

## 2021-06-25 NOTE — TELEPHONE ENCOUNTER
predniSONE (DELTASONE) 20 MG tablet 10 tablet 0 6/16/2021 6/26/2021 No   Sig - Route: Take 20 mg by mouth daily for 10 days     Take 3 tabs daily for 3 days, then 2  tabs daily for 3 days, then 1  tabs daily for 3 days, then 1/2 tab daily for 3  days, then stop..      Please clarify order

## 2021-06-26 NOTE — PROGRESS NOTES
Office Visit - Follow Up   Anabell Brumfield   36 y.o. female    Date of Visit: 6/8/2021    Chief Complaint   Patient presents with     Urticaria     increased intermittent hives (likely due to stressful situations) - using hydrocortisone prn         Assessment and Plan   1. Severe episode of recurrent major depressive disorder, without psychotic features (H)  Lengthy discussion with patient.  She verbally contracts to safety.  Begin Lexapro 5 mg daily for a week then increase to 10 mg daily.  Follow-up in 3 weeks.  Refer to psychology.  She was given hydroxyzine for her anxiety.  - escitalopram oxalate (LEXAPRO) 10 MG tablet; Take 0.5 tablets (5 mg total) by mouth daily for 7 days, THEN 1 tablet (10 mg total) daily for 23 days.  Dispense: 27 tablet; Refill: 0  - Comprehensive Metabolic Panel  - HM1 (CBC and Differential)  - Thyroid [place]  - AMB REFERRAL TO MENTAL HEALTH AND ADDICTION  - Adult (18+); Outpatient Treatment; Individual/Couples/Family/Group Therapy/Health Psychology; [facility]; Any Clinic Location; We will contact you to schedule the appointment or plea...    2. Generalized anxiety disorder  As above, add hydroxyzine as needed in addition to the Lexapro.  She did tolerate Lexapro in the past so I do not anticipate she will have issues with it.  She did have weight gain with it.  We discussed potentially trying sertraline.  Dr. Shepherd talked about maybe trying Wellbutrin or venlafaxine, however with the urticaria and her severe anxiety I am not sure if either of those would be great options at this time.  - escitalopram oxalate (LEXAPRO) 10 MG tablet; Take 0.5 tablets (5 mg total) by mouth daily for 7 days, THEN 1 tablet (10 mg total) daily for 23 days.  Dispense: 27 tablet; Refill: 0  - hydrOXYzine HCL (ATARAX) 25 MG tablet; Take 1 tablet (25 mg total) by mouth every 6 (six) hours as needed for itching or anxiety.  Dispense: 30 tablet; Refill: 0  - AMB REFERRAL TO MENTAL HEALTH AND  ADDICTION  - Adult (18+); Outpatient Treatment; Individual/Couples/Family/Group Therapy/Health Psychology; [facility]; Any Clinic Location; We will contact you to schedule the appointment or plea...    3. Hives  Begin Zyrtec 10 mg twice daily.  Hydroxyzine as needed.  See me in 2 to 3 weeks.  Sooner if more difficulties.  - cetirizine (ZYRTEC) 10 MG tablet; Take 1 tablet (10 mg total) by mouth 2 (two) times a day.  Dispense: 60 tablet; Refill: 0  - hydrOXYzine HCL (ATARAX) 25 MG tablet; Take 1 tablet (25 mg total) by mouth every 6 (six) hours as needed for itching or anxiety.  Dispense: 30 tablet; Refill: 0        Return in about 3 weeks (around 6/29/2021) for Recheck.     History of Present Illness   This 36 y.o. old this is a patient of Dr. Farhat healy who comes in today as an urgent evaluation for hives.  Actually a lot more is going on and extensive visit ensued.  She has struggled with postpartum depression after both her first and her second babies.  Her second child was 2 years ago.  She has been depressed ever since then.  She is getting worse and more anxious and has had suicidal thoughts all she did although she does not have a plan.  She wants help.  Lexapro has helped in the past.  She was also doing counseling and did well with counseling but her counselor is no longer working.  With all this anxiety she is developing urticaria.  She has a long history of urticaria since childhood.  She has been tested for allergies and those have been negative.  She is not taking anything for this.  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  She has been very stressed with work which also adds to the issue.    Review of Systems: A comprehensive review of systems was negative except as noted.     Medications, Allergies and Problem List   Reviewed, reconciled and updated  Post Discharge Medication Reconciliation Status:      Physical Exam   General Appearance:   Pleasant woman.  SYD-7 and  "PHQ-9 are noted.  She is tearful through the interview.  Typical urticaria throughout.    /82 (Patient Site: Left Arm, Patient Position: Sitting, Cuff Size: Adult Regular)   Pulse 76   Temp 98.7  F (37.1  C)   Ht 5' 5\" (1.651 m)   Wt 130 lb (59 kg)   SpO2 99%   BMI 21.63 kg/m           Additional Information   Current Outpatient Medications   Medication Sig Dispense Refill     cholecalciferol, vitamin D3, (VITAMIN D3) 2,000 unit capsule Take 5,000 Units by mouth daily.              MULTIVITAMIN ORAL Take 1 tablet by mouth daily.       cetirizine (ZYRTEC) 10 MG tablet Take 1 tablet (10 mg total) by mouth 2 (two) times a day. 60 tablet 0     escitalopram oxalate (LEXAPRO) 10 MG tablet Take 0.5 tablets (5 mg total) by mouth daily for 7 days, THEN 1 tablet (10 mg total) daily for 23 days. 27 tablet 0     hydrOXYzine HCL (ATARAX) 25 MG tablet Take 1 tablet (25 mg total) by mouth every 6 (six) hours as needed for itching or anxiety. 30 tablet 0     No current facility-administered medications for this visit.      No Known Allergies  Social History     Tobacco Use     Smoking status: Former Smoker     Smokeless tobacco: Never Used   Substance Use Topics     Alcohol use: Not Currently     Comment: occasional     Drug use: Not Currently     Types: Marijuana, Amphetamines     Comment:  rare - previous amphetemine use       Review and/or order of clinical lab tests:  Review and/or order of radiology tests:  Review and/or order of medicine tests:  Discussion of test results with performing physician:  Decision to obtain old records and/or obtain history from someone other than the patient:  Review and summarization of old records and/or obtaining history from someone other than the patient and.or discussion of case with another health care provider:  Independent visualization of image, tracing or specimen itself:    Time: not applicable     Chan Lebron MD  "

## 2021-06-26 NOTE — TELEPHONE ENCOUNTER
Reason for Call:  Other call back      Detailed comments: 2 sets of directions regarding the Prednisone  Need to clarirfy    Phone Number Patient can be reached at: Other phone number:  [phone]    Best Time: anytime    Can we leave a detailed message on this number?: Yes    Call taken on 6/17/2021 at 11:21 AM by Sara Pickering

## 2021-06-26 NOTE — PROGRESS NOTES
Office Visit - Follow Up   Anabell Brumfield   36 y.o. female    Date of Visit: 6/16/2021    Chief Complaint   Patient presents with     Urticaria        Assessment and Plan   1. Dermatitis  Etiology is uncertain.  If this is a presentation of hives it seems more inflammatory such as an urticarial vasculitis.  Possible contact dermatitis.  Her CBC and CMP look okay.  I do not see current evidence of infection.  She will finish her prednisone, apply triamcinolone.  I did give her some additional prednisone as she is going on vacation and if she flares while she tapers this she can return to the previously effective dose so as to not be miserable on vacation.  If this persists I have recommended biopsy by dermatologist.  - triamcinolone (KENALOG) 0.1 % cream; Apply topically 2 (two) times a day.  Dispense: 453.6 g; Refill: 1  - predniSONE (DELTASONE) 20 MG tablet; Take 20 mg by mouth daily for 10 days Take 3 tabs daily for 3 days, then 2  tabs daily for 3 days, then 1  tabs daily for 3 days, then 1/2 tab daily for 3  days, then stop..  Dispense: 10 tablet; Refill: 0    2. Moderate major depression (H)  Doing much better on Lexapro and has close follow-up with Dr. Chan RIVER.    3. Skin infection  No current signs of infection  - mupirocin (BACTROBAN) 2 % ointment; Apply topically 3 (three) times a day.  Dispense: 60 g; Refill: 1    Return in about 2 weeks (around 6/30/2021) for follow up with PCP.     History of Present Illness   This 36 y.o. old woman comes in for follow-up of skin rash.  She was previously seen Dr. Chan RIVER.  She had hives at that time which she has had before and symptoms of depression.  She was started on Lexapro and even before she started Lexapro her rash worsened significantly.  She had quite pruritic raised red rash on her arms and legs.  There was a lot of weeping.  She went to urgent care and was given prednisone doxycycline topical triamcinolone and topical mupirocin.  She is doing much  better.  Her mood is improved with the Lexapro.    Review of Systems: A comprehensive review of systems was negative except as noted.     Medications, Allergies and Problem List   Reviewed, reconciled and updated  Post Discharge Medication Reconciliation Status:      Physical Exam   General Appearance:   No acute distress    /58 (Patient Site: Left Arm, Patient Position: Sitting, Cuff Size: Adult Regular)   Pulse 80   Wt 131 lb 9.6 oz (59.7 kg)   SpO2 100%   BMI 21.90 kg/m      She has extensive rash on her arm chest and upper legs and to some extent lower legs.  These are bilateral.  There are some that are confluent and raised, some satellite raised papules.  Slight scale, no significant blistering.     Additional Information   Current Outpatient Medications   Medication Sig Dispense Refill     cetirizine (ZYRTEC) 10 MG tablet Take 1 tablet (10 mg total) by mouth 2 (two) times a day. 60 tablet 0     cholecalciferol, vitamin D3, (VITAMIN D3) 2,000 unit capsule Take 5,000 Units by mouth daily.              doxycycline (VIBRA-TABS) 100 MG tablet Take 1 tablet (100 mg total) by mouth 2 (two) times a day for 10 days. 20 tablet 0     escitalopram oxalate (LEXAPRO) 10 MG tablet Take 0.5 tablets (5 mg total) by mouth daily for 7 days, THEN 1 tablet (10 mg total) daily for 23 days. 27 tablet 0     hydrOXYzine HCL (ATARAX) 25 MG tablet Take 1 tablet (25 mg total) by mouth every 6 (six) hours as needed for itching or anxiety. 30 tablet 0     MULTIVITAMIN ORAL Take 1 tablet by mouth daily.       predniSONE (DELTASONE) 20 MG tablet Take 20 mg by mouth daily for 10 days Take 3 tabs daily for 3 days, then 2  tabs daily for 3 days, then 1  tabs daily for 3 days, then 1/2 tab daily for 3  days, then stop.. 10 tablet 0     triamcinolone (KENALOG) 0.1 % cream Apply topically 2 (two) times a day. 453.6 g 1     mupirocin (BACTROBAN) 2 % ointment Apply topically 3 (three) times a day. 60 g 1     No current  facility-administered medications for this visit.      No Known Allergies  Social History     Tobacco Use     Smoking status: Former Smoker     Smokeless tobacco: Never Used   Substance Use Topics     Alcohol use: Not Currently     Comment: occasional     Drug use: Not Currently     Types: Marijuana, Amphetamines     Comment:  rare - previous amphetemine use       Review and/or order of clinical lab tests:  Review and/or order of radiology tests:  Review and/or order of medicine tests:  Discussion of test results with performing physician:  Decision to obtain old records and/or obtain history from someone other than the patient:  Review and summarization of old records and/or obtaining history from someone other than the patient and.or discussion of case with another health care provider:  Independent visualization of image, tracing or specimen itself:    Time:      Yoan Jones MD

## 2021-07-04 NOTE — ADDENDUM NOTE
Addendum Note by Yoan Colon MD at 6/17/2021 12:13 PM     Author: Yoan Colon MD Service: -- Author Type: Physician    Filed: 6/17/2021 12:13 PM Encounter Date: 6/16/2021 Status: Signed    : Yoan Colon MD (Physician)    Addended by: YOAN COLON on: 6/17/2021 12:13 PM        Modules accepted: Orders

## 2021-07-04 NOTE — PROGRESS NOTES
Progress Notes by Rubi Malik MD at 2021  9:20 AM     Author: Rubi Malik MD Service: -- Author Type: Physician    Filed: 6/15/2021  1:27 PM Encounter Date: 2021 Status: Addendum    : Rubi Malik MD (Physician)    Related Notes: Original Note by Rubi Malik MD (Physician) filed at 6/15/2021  1:26 PM       Chief Complaint   Patient presents with   ? Urticaria     ARMS, LEGA, RASH/HIVES, GOING FOR ABOUT 4 MONTHS.        HPI:  Anabell Brumfield is a 36 y.o. female who presents today complaining of  Rash I  Started initially as a rash in axilla 4 months ago and now starting 6 days ago started to spread over arms, now on trunk and legs, pruritic, urticarial lesions  Patient seen on Tuesday by internal medicine provider to address postpartum depression and anxiety started on lexapro and discussed rash, started on vistaril which has helped with itching at night, benadryl was not working, and zyrtec, mainly on arms at that time    No shortness of breath/wheezing at present or at time of onset of lesions    Noted some crusting of lesions from itching concerned may be secondary bacterial infection, no fever, no increase in pain in areas of crusting    Using topical vitamin E cream and otc Calimoseptine lotion, similar to calamine lotion     Patient with history of sensitive skin for years, no history of similar rash in the past       History obtained from the patient.    Problem List:  2015: Pregnant  2015:  delivery delivered  2014: Anxiety  2014: Counseling for travel  2014: UTI (lower urinary tract infection)  Urethritis  Irritable bowel syndrome with diarrhea  Skin: A Rash  Herpes Simplex  Anxiety  Asthma  Irregular Length Of Menstrual Periods      Past Medical History:   Diagnosis Date   ? Anxiety disorder    ? Asthma    ? Herpes     gential HSV1   ? IBS (irritable bowel syndrome)    ? Mental disorder     major depressive disorder   ?  Postpartum depression    ? Urethritis        Social History     Tobacco Use   ? Smoking status: Former Smoker   ? Smokeless tobacco: Never Used   Substance Use Topics   ? Alcohol use: Not Currently     Comment: occasional       Review of Systems  Pertinent items are noted in HPI.      Vitals:    06/12/21 0920   BP: 107/71   Patient Site: Right Arm   Patient Position: Sitting   Cuff Size: Adult Regular   Pulse: 76   Resp: 16   Temp: 98  F (36.7  C)   TempSrc: Oral   SpO2: 100%       Physical Exam   General appearance: alert, appears stated age and fatigued  Extremities: extremities normal, atraumatic, no cyanosis or edema and covered in urticarial lesions, excoriated on arms with small amount of crusting   Skin: scattered urticarial lesions over trunk, neck bilateral arms and legs, lesions on arms are excoriated and crusting   Neurologic: Alert and oriented X 3, normal strength and tone. Normal symmetric reflexes. Normal coordination and gait   Psyche-mild anxious due to prurtic rash, lexapro has helped with anxiety/depression, denies suicidal/homicidal ideation or plan     Labs:  No results found for this or any previous visit (from the past 72 hour(s)).    Radiology:    No results found.    .       This note has been dictated using dragon voice recognition software. Any grammatical or context distortions are unintentional and inherent to the system.     1. Contact dermatitis, unspecified contact dermatitis type, unspecified trigger  triamcinolone (KENALOG) 0.1 % cream    predniSONE (DELTASONE) 20 MG tablet    DISCONTINUED: predniSONE (DELTASONE) 20 MG tablet    urticarial lesions   2. Skin infection  doxycycline (VIBRA-TABS) 100 MG tablet    mupirocin (BACTROBAN) 2 % ointment     On the day of the encounter time spent on chart review, patient visit, review of testing, documentation and or discussion with other providers was 30  minutes       Patient Instructions     Start prednisone taper first dose today    Start  "doxcycline take with food-for crusting, early infection-take with food    Put mupricion on crusting areas followed by triamcinolone cream    Apply triamcinolone cream to rash    Wash with unscented dove-bar    Start eucerin cream or aquaphor cream         Patient Education     Contact Dermatitis  Contact dermatitis is a skin rash caused by something that touches the skin and makes it irritated and inflamed. Your skin may be red, swollen, dry, and may be cracked. Blisters may form and ooze. The rash will itch.  Contact dermatitis can form on the face and neck, backs of hands, forearms, genitals, and lower legs.  People can get contact dermatitis from lots of sources. These include:    Plants such as poison ivy, oak, or sumac    Chemicals in hair dyes and rinses, soaps, solvents, waxes, fingernail polish, and deodorants     Jewelry or watchbands made of nickel  Contact dermatitis is not passed from person to person.  Talk with your healthcare provider about what may have caused the rash. A type of allergy testing called \"patch testing\" may be used to discover what you are allergic to. You will need to avoid the source of your rash in the future to prevent it from coming back.  Treatment is done to relieve itching and prevent the rash from coming back. The rash should go away in a few days to a few weeks.  Home care  Your healthcare provider may prescribe medicine to relieve swelling and itching. Follow all instructions when using these medicines.  General care:    Avoid anything that heats up your skin, such as hot showers or baths, or direct sunlight. This can make itching worse.    Apply cold compresses to soothe your sores to help relieve your symptoms. Do this for 30 minutes 3 to 4 times a day. You can make a cold compress by soaking a cloth in cold water. Squeeze out excess water. You can add colloidal oatmeal to the water to help reduce itching. For severe itching in a small area, apply an ice pack wrapped in a " thin towel. Do this for 20 minutes 3 to 4 times a day.    You can also try wet dressings. One way to do this is to wear a wet piece of clothing under a dry one. Wear a damp shirt under a dry shirt if your upper body is affected. This can relieve itching and prevent you from scratching the affected area.    You can also help relieve large areas of itching by taking a lukewarm bath with colloidal oatmeal added to the water.    Use hydrocortisone cream for redness and irritation, unless another medicine was prescribed. You can also use benzocaine anesthetic cream or spray. Calamine lotion can also relieve mild symptoms.    Use oral diphenhydramine to help reduce itching. You can buy this antihistamine at drug and grocery stores. It can make you sleepy, so use lower doses during the daytime. Or you can use loratadine. This is an antihistamine that will not make you sleepy. Do not use diphenhydramine if you have glaucoma or have trouble urinating due to an enlarged prostate.    If a plant causes your rash, make sure to wash your skin and the clothes you were wearing when you came into contact with the plant. This is to wash away the plant oils that gave you the rash and prevent more or worse symptoms.    Stay away from the substance or object that causes your symptoms. If you cant avoid it, wear gloves or some other type of protection.  Follow-up care  Follow up with your healthcare provider, or as advised.  When to seek medical advice  Call your healthcare provider right away if any of these occur:    Spreading of the rash to other parts of your body    Severe swelling of your face, eyelids, mouth, throat or tongue    Trouble urinating due to swelling in the genital area    Fever of 100.4 F (38 C) or higher    Redness or swelling that gets worse    Pain that gets worse    Foul-smelling fluid leaking from the skin    Yellow-brown crusts on the open blisters  Date Last Reviewed: 9/1/2016 2000-2017 [employer]  [employer]. [address] [place] [address]. All rights reserved. This information is not intended as a substitute for professional medical care. Always follow your healthcare professional's instructions.           Patient Education     Understanding Contact Dermatitis    Contact dermatitis is a common type of skin rash. Its caused by something that touches the skin and makes it irritated and inflamed. It can occur on skin on any part of the body, such as the face, neck, hands, arms, and legs. Contact dermatitis is not spread from person to person.  Often, the reaction of contact dermatitis occurs 1 to 2 days after contact with the offending agent.   How to say it  MARKUS-tact [id number]  What causes contact dermatitis?  Its caused by something that irritates the skin, or that creates an allergic reaction on the skin. People can get contact dermatitis from many kinds of things. These include:    Plant oils in poison ivy, oak, and sumac    Chemicals in household , solvents, and glue    Chemicals in makeup, soap, laundry detergent, perfume, acne cream, and hair products    Certain medicines, such as neomycin, bacitracin, benzocaine, and thimerosal    Metals such as nickel, found in some jewelry and watch bands     The sticky material on the back of bandages and tape (adhesive)    Things that can cause tiny breaks in the skin, such as wood, fiberglass, metal tools, and plant thorns    Rubber latex in surgical gloves and other medical supplies  Dermatitis can also be caused by the skin being damp for long periods of time. This can happen from washing your hands too often, or working with wet materials.  Symptoms of contact dermatitis  Symptoms can include skin that is:    Blistered    Burning    Cracked    Dry    Itchy    Painful    Red    Rough, thickened, and leathery    Swollen    Warm  The blisters may ooze fluid and form crusts.  Treatment for contact dermatitis  Treatment is done to help relieve  itching and reduce inflammation. The rash should go away in a few days to a few weeks. Treatments include:    Cool, moist compress. Use a clean damp cloth. Put it on the area for 20 to 30 minutes, 5 to 6 times a day for the first 3 days.    Steroid cream or ointment. You can apply this medicine several times a day on clean skin.    Oral corticosteroid. Your healthcare provider may prescribe this medicine if you have severe skin symptoms on a large part of your body.  Your healthcare provider may give you a steroid injection instead of pills.    Oral antihistamine. This medicine can help reduce itching.    Colloidal oatmeal bath. Soaking in water with colloidal oatmeal can help soothe skin.    Plain cream, lotion, or ointment. Cream, lotion, or ointment without medicine can help to soothe and protect your skin.  Living with contact dermatitis  Talk with your healthcare provider about what may have caused your contact dermatitis. Patch testing may help you figure out what caused the rash so you can avoid further contact with it. Once you learn what caused your rash, make sure to avoid that substance. If your skin comes into contact with it again, make sure to wash your skin right away. If you cant avoid the substance, wear gloves or other protective clothing before you touch it. Wash the clothing you were wearing. This is because the substance you are allergic to may stay on them until it's washed off. Pets can also pass on allergens such as poison ivy from the plant to your skin. Bathe pets if you suspect they have been in contact. Or use a cream, lotion, or ointment to protect your skin.  When to call your healthcare provider  Call your healthcare provider right away if you have any of these:    Fever of 100.4 F (38 C) or higher, or as directed by your healthcare provider    Symptoms that dont get better, or get worse    New symptoms  Date Last Reviewed: 5/1/2016 2000-2019 [employer]. [address] [place] [address]  [place] [address]. All rights reserved. This information is not intended as a substitute for professional medical care. Always follow your healthcare professional's instructions.           Patient Education     Cellulitis  Cellulitis is an infection of the deep layers of skin. A break in the skin, such as a cut or scratch, can let bacteria under the skin. If the bacteria get to deep layers of the skin, it can be serious. If not treated, cellulitis can get into the bloodstream and lymph nodes. The infection can then spread throughout the body. This causes serious illness.  Cellulitis causes the affected skin to become red, swollen, warm, and sore. The reddened areas have a visible border. An open sore may leak fluid (pus). You may have a fever, chills, and pain.  Cellulitis is treated with antibiotics taken for 7 to 10 days. An open sore may be cleaned and covered with cool wet gauze. Symptoms should get better 1 to 2 days after treatment is started. Make sure to take all the antibiotics for the full number of days until they are gone. Keep taking the medicine even if your symptoms go away.  Home care  Follow these tips:    Limit the use of the part of your body with cellulitis.     If the infection is on your leg, keep your leg raised while sitting. This will help to reduce swelling.    Take all of the antibiotic medicine exactly as directed until it is gone. Do not miss any doses, especially during the first 7 days. Dont stop taking the medicine when your symptoms get better.    Keep the affected area clean and dry.    Wash your hands with soap and warm water before and after touching your skin. Anyone else who touches your skin should also wash his or her hands. Don't share towels.  Follow-up care  Follow up with your healthcare provider, or as advised. If your infection does not go away on the first antibiotic, your healthcare provider will prescribe a different one.  When to seek medical advice  Call  your healthcare provider right away if any of these occur:    Red areas that spread    Swelling or pain that gets worse    Fluid leaking from the skin (pus)    Fever higher of 100.4  F (38.0  C) or higher after 2 days on antibiotics  Date Last Reviewed: 9/1/2016 2000-2017 [employer]. [address] [place] [address]. All rights reserved. This information is not intended as a substitute for professional medical care. Always follow your healthcare professional's instructions.              Reviewed medication instructions and side effects. Follow up if experiences side effects.     I reviewed supportive care, otc meds to use if needed, expected course, and signs of concern.  Follow up as needed or if he does not improve within 2 -3 day(s) or if worsens in any way.  Reviewed red flag symptoms and is to go to the ER if experiences any of these     Rubi Malik MD

## 2021-07-04 NOTE — ADDENDUM NOTE
Addendum Note by Rimma Hayes LPN at 6/17/2021 11:36 AM     Author: Rimma Hayes LPN Service: -- Author Type: Licensed Nurse    Filed: 6/17/2021 11:36 AM Encounter Date: 6/16/2021 Status: Signed    : Rimma Hayes LPN (Licensed Nurse)    Addended by: RIMMA HAYES on: 6/17/2021 11:36 AM        Modules accepted: Orders

## 2021-07-04 NOTE — PATIENT INSTRUCTIONS - HE
"Patient Instructions by Rubi Malik MD at 6/12/2021  9:20 AM     Author: Rubi Malik MD Service: -- Author Type: Physician    Filed: 6/12/2021 10:55 AM Encounter Date: 6/12/2021 Status: Addendum    : Rubi Malik MD (Physician)    Related Notes: Original Note by Rubi Malik MD (Physician) filed at 6/12/2021 10:55 AM         Start prednisone taper first dose today    Start doxcycline take with food-for crusting, early infection-take with food    Put mupricion on crusting areas followed by triamcinolone cream    Apply triamcinolone cream to rash    Wash with unscented dove-bar    Start eucerin cream or aquaphor cream         Patient Education     Contact Dermatitis  Contact dermatitis is a skin rash caused by something that touches the skin and makes it irritated and inflamed. Your skin may be red, swollen, dry, and may be cracked. Blisters may form and ooze. The rash will itch.  Contact dermatitis can form on the face and neck, backs of hands, forearms, genitals, and lower legs.  People can get contact dermatitis from lots of sources. These include:    Plants such as poison ivy, oak, or sumac    Chemicals in hair dyes and rinses, soaps, solvents, waxes, fingernail polish, and deodorants     Jewelry or watchbands made of nickel  Contact dermatitis is not passed from person to person.  Talk with your healthcare provider about what may have caused the rash. A type of allergy testing called \"patch testing\" may be used to discover what you are allergic to. You will need to avoid the source of your rash in the future to prevent it from coming back.  Treatment is done to relieve itching and prevent the rash from coming back. The rash should go away in a few days to a few weeks.  Home care  Your healthcare provider may prescribe medicine to relieve swelling and itching. Follow all instructions when using these medicines.  General care:    Avoid anything that heats up your " skin, such as hot showers or baths, or direct sunlight. This can make itching worse.    Apply cold compresses to soothe your sores to help relieve your symptoms. Do this for 30 minutes 3 to 4 times a day. You can make a cold compress by soaking a cloth in cold water. Squeeze out excess water. You can add colloidal oatmeal to the water to help reduce itching. For severe itching in a small area, apply an ice pack wrapped in a thin towel. Do this for 20 minutes 3 to 4 times a day.    You can also try wet dressings. One way to do this is to wear a wet piece of clothing under a dry one. Wear a damp shirt under a dry shirt if your upper body is affected. This can relieve itching and prevent you from scratching the affected area.    You can also help relieve large areas of itching by taking a lukewarm bath with colloidal oatmeal added to the water.    Use hydrocortisone cream for redness and irritation, unless another medicine was prescribed. You can also use benzocaine anesthetic cream or spray. Calamine lotion can also relieve mild symptoms.    Use oral diphenhydramine to help reduce itching. You can buy this antihistamine at drug and grocery stores. It can make you sleepy, so use lower doses during the daytime. Or you can use loratadine. This is an antihistamine that will not make you sleepy. Do not use diphenhydramine if you have glaucoma or have trouble urinating due to an enlarged prostate.    If a plant causes your rash, make sure to wash your skin and the clothes you were wearing when you came into contact with the plant. This is to wash away the plant oils that gave you the rash and prevent more or worse symptoms.    Stay away from the substance or object that causes your symptoms. If you cant avoid it, wear gloves or some other type of protection.  Follow-up care  Follow up with your healthcare provider, or as advised.  When to seek medical advice  Call your healthcare provider right away if any of these  occur:    Spreading of the rash to other parts of your body    Severe swelling of your face, eyelids, mouth, throat or tongue    Trouble urinating due to swelling in the genital area    Fever of 100.4 F (38 C) or higher    Redness or swelling that gets worse    Pain that gets worse    Foul-smelling fluid leaking from the skin    Yellow-brown crusts on the open blisters  Date Last Reviewed: 9/1/2016 2000-2017 [employer]. [address] [address]. All rights reserved. This information is not intended as a substitute for professional medical care. Always follow your healthcare professional's instructions.           Patient Education     Understanding Contact Dermatitis    Contact dermatitis is a common type of skin rash. Its caused by something that touches the skin and makes it irritated and inflamed. It can occur on skin on any part of the body, such as the face, neck, hands, arms, and legs. Contact dermatitis is not spread from person to person.  Often, the reaction of contact dermatitis occurs 1 to 2 days after contact with the offending agent.   How to say it  MARKUS-tact emn-luf-IO-tis  What causes contact dermatitis?  Its caused by something that irritates the skin, or that creates an allergic reaction on the skin. People can get contact dermatitis from many kinds of things. These include:    Plant oils in poison ivy, oak, and sumac    Chemicals in household , solvents, and glue    Chemicals in makeup, soap, laundry detergent, perfume, acne cream, and hair products    Certain medicines, such as neomycin, bacitracin, benzocaine, and thimerosal    Metals such as nickel, found in some jewelry and watch bands     The sticky material on the back of bandages and tape (adhesive)    Things that can cause tiny breaks in the skin, such as wood, fiberglass, metal tools, and plant thorns    Rubber latex in surgical gloves and other medical supplies  Dermatitis can also be caused by  the skin being damp for long periods of time. This can happen from washing your hands too often, or working with wet materials.  Symptoms of contact dermatitis  Symptoms can include skin that is:    Blistered    Burning    Cracked    Dry    Itchy    Painful    Red    Rough, thickened, and leathery    Swollen    Warm  The blisters may ooze fluid and form crusts.  Treatment for contact dermatitis  Treatment is done to help relieve itching and reduce inflammation. The rash should go away in a few days to a few weeks. Treatments include:    Cool, moist compress. Use a clean damp cloth. Put it on the area for 20 to 30 minutes, 5 to 6 times a day for the first 3 days.    Steroid cream or ointment. You can apply this medicine several times a day on clean skin.    Oral corticosteroid. Your healthcare provider may prescribe this medicine if you have severe skin symptoms on a large part of your body.  Your healthcare provider may give you a steroid injection instead of pills.    Oral antihistamine. This medicine can help reduce itching.    Colloidal oatmeal bath. Soaking in water with colloidal oatmeal can help soothe skin.    Plain cream, lotion, or ointment. Cream, lotion, or ointment without medicine can help to soothe and protect your skin.  Living with contact dermatitis  Talk with your healthcare provider about what may have caused your contact dermatitis. Patch testing may help you figure out what caused the rash so you can avoid further contact with it. Once you learn what caused your rash, make sure to avoid that substance. If your skin comes into contact with it again, make sure to wash your skin right away. If you cant avoid the substance, wear gloves or other protective clothing before you touch it. Wash the clothing you were wearing. This is because the substance you are allergic to may stay on them until it's washed off. Pets can also pass on allergens such as poison ivy from the plant to your skin. Bathe pets  if you suspect they have been in contact. Or use a cream, lotion, or ointment to protect your skin.  When to call your healthcare provider  Call your healthcare provider right away if you have any of these:    Fever of 100.4 F (38 C) or higher, or as directed by your healthcare provider    Symptoms that dont get better, or get worse    New symptoms  Date Last Reviewed: 5/1/2016 2000-2019 [employer]. [address] [place] [address], [place] [address]. All rights reserved. This information is not intended as a substitute for professional medical care. Always follow your healthcare professional's instructions.           Patient Education     Cellulitis  Cellulitis is an infection of the deep layers of skin. A break in the skin, such as a cut or scratch, can let bacteria under the skin. If the bacteria get to deep layers of the skin, it can be serious. If not treated, cellulitis can get into the bloodstream and lymph nodes. The infection can then spread throughout the body. This causes serious illness.  Cellulitis causes the affected skin to become red, swollen, warm, and sore. The reddened areas have a visible border. An open sore may leak fluid (pus). You may have a fever, chills, and pain.  Cellulitis is treated with antibiotics taken for 7 to 10 days. An open sore may be cleaned and covered with cool wet gauze. Symptoms should get better 1 to 2 days after treatment is started. Make sure to take all the antibiotics for the full number of days until they are gone. Keep taking the medicine even if your symptoms go away.  Home care  Follow these tips:    Limit the use of the part of your body with cellulitis.     If the infection is on your leg, keep your leg raised while sitting. This will help to reduce swelling.    Take all of the antibiotic medicine exactly as directed until it is gone. Do not miss any doses, especially during the first 7 days. Dont stop taking the medicine when your symptoms get  better.    Keep the affected area clean and dry.    Wash your hands with soap and warm water before and after touching your skin. Anyone else who touches your skin should also wash his or her hands. Don't share towels.  Follow-up care  Follow up with your healthcare provider, or as advised. If your infection does not go away on the first antibiotic, your healthcare provider will prescribe a different one.  When to seek medical advice  Call your healthcare provider right away if any of these occur:    Red areas that spread    Swelling or pain that gets worse    Fluid leaking from the skin (pus)    Fever higher of 100.4  F (38.0  C) or higher after 2 days on antibiotics  Date Last Reviewed: 9/1/2016 2000-2017 [employer]. [address] [place] [address], [place] [address]. All rights reserved. This information is not intended as a substitute for professional medical care. Always follow your healthcare professional's instructions.

## 2021-07-05 PROBLEM — F32.1 MODERATE MAJOR DEPRESSION (H): Status: ACTIVE | Noted: 2021-06-16

## 2021-07-06 VITALS
OXYGEN SATURATION: 99 % | TEMPERATURE: 98.7 F | DIASTOLIC BLOOD PRESSURE: 82 MMHG | HEIGHT: 65 IN | BODY MASS INDEX: 21.66 KG/M2 | SYSTOLIC BLOOD PRESSURE: 124 MMHG | WEIGHT: 130 LBS | HEART RATE: 76 BPM

## 2021-07-06 VITALS
RESPIRATION RATE: 16 BRPM | OXYGEN SATURATION: 100 % | DIASTOLIC BLOOD PRESSURE: 71 MMHG | SYSTOLIC BLOOD PRESSURE: 107 MMHG | TEMPERATURE: 98 F | HEART RATE: 76 BPM

## 2021-07-06 VITALS
BODY MASS INDEX: 21.9 KG/M2 | OXYGEN SATURATION: 100 % | HEART RATE: 80 BPM | SYSTOLIC BLOOD PRESSURE: 104 MMHG | WEIGHT: 131.6 LBS | DIASTOLIC BLOOD PRESSURE: 58 MMHG

## 2021-07-06 ASSESSMENT — PATIENT HEALTH QUESTIONNAIRE - PHQ9: SUM OF ALL RESPONSES TO PHQ QUESTIONS 1-9: 18

## 2021-07-08 ASSESSMENT — ANXIETY QUESTIONNAIRES: GAD7 TOTAL SCORE: 19

## 2021-10-11 ENCOUNTER — HEALTH MAINTENANCE LETTER (OUTPATIENT)
Age: 36
End: 2021-10-11

## 2021-10-19 PROBLEM — F32.9 MAJOR DEPRESSION: Status: ACTIVE | Noted: 2021-06-16

## 2022-02-15 ENCOUNTER — TRANSFERRED RECORDS (OUTPATIENT)
Dept: HEALTH INFORMATION MANAGEMENT | Facility: CLINIC | Age: 37
End: 2022-02-15

## 2022-05-22 ENCOUNTER — HEALTH MAINTENANCE LETTER (OUTPATIENT)
Age: 37
End: 2022-05-22

## 2022-09-25 ENCOUNTER — HEALTH MAINTENANCE LETTER (OUTPATIENT)
Age: 37
End: 2022-09-25

## 2023-05-25 ENCOUNTER — LAB REQUISITION (OUTPATIENT)
Dept: LAB | Facility: CLINIC | Age: 38
End: 2023-05-25

## 2023-05-25 DIAGNOSIS — Z12.4 ENCOUNTER FOR SCREENING FOR MALIGNANT NEOPLASM OF CERVIX: ICD-10-CM

## 2023-05-25 PROCEDURE — G0145 SCR C/V CYTO,THINLAYER,RESCR: HCPCS | Performed by: OBSTETRICS & GYNECOLOGY

## 2023-06-01 LAB
BKR LAB AP GYN ADEQUACY: NORMAL
BKR LAB AP GYN INTERPRETATION: NORMAL
BKR LAB AP HPV REFLEX: NORMAL
BKR LAB AP LMP: NORMAL
BKR LAB AP PREVIOUS ABNL DX: NORMAL
BKR LAB AP PREVIOUS ABNORMAL: NORMAL
PATH REPORT.COMMENTS IMP SPEC: NORMAL
PATH REPORT.COMMENTS IMP SPEC: NORMAL
PATH REPORT.RELEVANT HX SPEC: NORMAL

## 2023-06-04 ENCOUNTER — HEALTH MAINTENANCE LETTER (OUTPATIENT)
Age: 38
End: 2023-06-04

## 2023-06-06 ENCOUNTER — LAB REQUISITION (OUTPATIENT)
Dept: LAB | Facility: CLINIC | Age: 38
End: 2023-06-06

## 2023-06-06 DIAGNOSIS — Z00.00 ENCOUNTER FOR GENERAL ADULT MEDICAL EXAMINATION WITHOUT ABNORMAL FINDINGS: ICD-10-CM

## 2023-06-06 LAB
BASOPHILS # BLD AUTO: 0 10E3/UL (ref 0–0.2)
BASOPHILS NFR BLD AUTO: 1 %
CHOLEST SERPL-MCNC: 199 MG/DL
EOSINOPHIL # BLD AUTO: 0.1 10E3/UL (ref 0–0.7)
EOSINOPHIL NFR BLD AUTO: 1 %
ERYTHROCYTE [DISTWIDTH] IN BLOOD BY AUTOMATED COUNT: 11.9 % (ref 10–15)
HCT VFR BLD AUTO: 39.6 % (ref 35–47)
HDLC SERPL-MCNC: 71 MG/DL
HGB BLD-MCNC: 12.7 G/DL (ref 11.7–15.7)
IMM GRANULOCYTES # BLD: 0 10E3/UL
IMM GRANULOCYTES NFR BLD: 0 %
LDLC SERPL CALC-MCNC: 110 MG/DL
LYMPHOCYTES # BLD AUTO: 2 10E3/UL (ref 0.8–5.3)
LYMPHOCYTES NFR BLD AUTO: 32 %
MCH RBC QN AUTO: 29.3 PG (ref 26.5–33)
MCHC RBC AUTO-ENTMCNC: 32.1 G/DL (ref 31.5–36.5)
MCV RBC AUTO: 92 FL (ref 78–100)
MONOCYTES # BLD AUTO: 0.6 10E3/UL (ref 0–1.3)
MONOCYTES NFR BLD AUTO: 10 %
NEUTROPHILS # BLD AUTO: 3.5 10E3/UL (ref 1.6–8.3)
NEUTROPHILS NFR BLD AUTO: 56 %
NONHDLC SERPL-MCNC: 128 MG/DL
NRBC # BLD AUTO: 0 10E3/UL
NRBC BLD AUTO-RTO: 0 /100
PLATELET # BLD AUTO: 241 10E3/UL (ref 150–450)
RBC # BLD AUTO: 4.33 10E6/UL (ref 3.8–5.2)
TRIGL SERPL-MCNC: 88 MG/DL
WBC # BLD AUTO: 6.3 10E3/UL (ref 4–11)

## 2023-06-06 PROCEDURE — 85025 COMPLETE CBC W/AUTO DIFF WBC: CPT | Performed by: OBSTETRICS & GYNECOLOGY

## 2023-06-06 PROCEDURE — 80061 LIPID PANEL: CPT | Performed by: OBSTETRICS & GYNECOLOGY

## 2023-11-29 ENCOUNTER — TRANSFERRED RECORDS (OUTPATIENT)
Dept: HEALTH INFORMATION MANAGEMENT | Facility: CLINIC | Age: 38
End: 2023-11-29

## 2024-02-23 ENCOUNTER — OFFICE VISIT (OUTPATIENT)
Dept: INTERNAL MEDICINE | Facility: CLINIC | Age: 39
End: 2024-02-23
Payer: COMMERCIAL

## 2024-02-23 VITALS
BODY MASS INDEX: 25.17 KG/M2 | TEMPERATURE: 98.1 F | SYSTOLIC BLOOD PRESSURE: 120 MMHG | WEIGHT: 128.2 LBS | HEIGHT: 60 IN | RESPIRATION RATE: 16 BRPM | DIASTOLIC BLOOD PRESSURE: 70 MMHG | HEART RATE: 86 BPM | OXYGEN SATURATION: 100 %

## 2024-02-23 DIAGNOSIS — F43.23 ADJUSTMENT DISORDER WITH MIXED ANXIETY AND DEPRESSED MOOD: ICD-10-CM

## 2024-02-23 DIAGNOSIS — J45.20 MILD INTERMITTENT ASTHMA WITHOUT COMPLICATION: ICD-10-CM

## 2024-02-23 DIAGNOSIS — R45.86 MOOD SWINGS: ICD-10-CM

## 2024-02-23 DIAGNOSIS — Z00.00 ANNUAL PHYSICAL EXAM: Primary | ICD-10-CM

## 2024-02-23 DIAGNOSIS — F40.243 ANXIETY WITH FLYING: ICD-10-CM

## 2024-02-23 DIAGNOSIS — E78.5 HYPERLIPIDEMIA LDL GOAL <100: ICD-10-CM

## 2024-02-23 PROCEDURE — 99395 PREV VISIT EST AGE 18-39: CPT | Performed by: INTERNAL MEDICINE

## 2024-02-23 RX ORDER — SERTRALINE HYDROCHLORIDE 25 MG/1
25 TABLET, FILM COATED ORAL DAILY
Qty: 30 TABLET | Refills: 3 | Status: SHIPPED | OUTPATIENT
Start: 2024-02-23

## 2024-02-23 RX ORDER — LORAZEPAM 0.5 MG/1
0.5 TABLET ORAL EVERY 6 HOURS PRN
Qty: 10 TABLET | Refills: 0 | Status: SHIPPED | OUTPATIENT
Start: 2024-02-23

## 2024-02-23 RX ORDER — LEVALBUTEROL TARTRATE 45 UG/1
2 AEROSOL, METERED ORAL EVERY 4 HOURS PRN
Qty: 15 G | Refills: 3 | Status: SHIPPED | OUTPATIENT
Start: 2024-02-23

## 2024-02-23 RX ORDER — HYDROXYZINE HYDROCHLORIDE 25 MG/1
25 TABLET, FILM COATED ORAL 3 TIMES DAILY PRN
Qty: 40 TABLET | Refills: 3 | Status: SHIPPED | OUTPATIENT
Start: 2024-02-23

## 2024-02-23 SDOH — HEALTH STABILITY: PHYSICAL HEALTH: ON AVERAGE, HOW MANY DAYS PER WEEK DO YOU ENGAGE IN MODERATE TO STRENUOUS EXERCISE (LIKE A BRISK WALK)?: 3 DAYS

## 2024-02-23 ASSESSMENT — SOCIAL DETERMINANTS OF HEALTH (SDOH): HOW OFTEN DO YOU GET TOGETHER WITH FRIENDS OR RELATIVES?: THREE TIMES A WEEK

## 2024-02-23 ASSESSMENT — ASTHMA QUESTIONNAIRES
QUESTION_4 LAST FOUR WEEKS HOW OFTEN HAVE YOU USED YOUR RESCUE INHALER OR NEBULIZER MEDICATION (SUCH AS ALBUTEROL): NOT AT ALL
QUESTION_1 LAST FOUR WEEKS HOW MUCH OF THE TIME DID YOUR ASTHMA KEEP YOU FROM GETTING AS MUCH DONE AT WORK, SCHOOL OR AT HOME: A LITTLE OF THE TIME
QUESTION_3 LAST FOUR WEEKS HOW OFTEN DID YOUR ASTHMA SYMPTOMS (WHEEZING, COUGHING, SHORTNESS OF BREATH, CHEST TIGHTNESS OR PAIN) WAKE YOU UP AT NIGHT OR EARLIER THAN USUAL IN THE MORNING: NOT AT ALL
QUESTION_2 LAST FOUR WEEKS HOW OFTEN HAVE YOU HAD SHORTNESS OF BREATH: ONCE OR TWICE A WEEK
ACT_TOTALSCORE: 22
QUESTION_5 LAST FOUR WEEKS HOW WOULD YOU RATE YOUR ASTHMA CONTROL: WELL CONTROLLED
ACT_TOTALSCORE: 22

## 2024-02-23 ASSESSMENT — PATIENT HEALTH QUESTIONNAIRE - PHQ9
SUM OF ALL RESPONSES TO PHQ QUESTIONS 1-9: 11
10. IF YOU CHECKED OFF ANY PROBLEMS, HOW DIFFICULT HAVE THESE PROBLEMS MADE IT FOR YOU TO DO YOUR WORK, TAKE CARE OF THINGS AT HOME, OR GET ALONG WITH OTHER PEOPLE: SOMEWHAT DIFFICULT
SUM OF ALL RESPONSES TO PHQ QUESTIONS 1-9: 11

## 2024-02-23 NOTE — PATIENT INSTRUCTIONS
Use Xopenex as needed for asthma    2. For mood fluctuations and depression: Can try Lamictal, other possibility is Olanzapine to help make mood stable and depression. Lets try to Sertraline 25 mg.    3. Can try Magnesium Glycinate at night to help with cramps and sleep.     4. Get flu shot and pneumonia shot ( Pneumo 20)    5. Schedule fasting labs.

## 2024-02-23 NOTE — PROGRESS NOTES
Preventive Care Visit  [facility]  Lamar Gabriel MD, Internal Medicine  Feb 23, 2024    Assessment & Plan     Annual physical exam  Will check fasting labs.  She plans to get flu shot and pneumonia shot at a later time.    Mild intermittent asthma without complication  Currently without exacerbation, Xopenex inhaler was failed.  - levalbuterol (XOPENEX HFA) 45 MCG/ACT inhaler; Inhale 2 puffs into the lungs every 4 hours as needed for shortness of breath or wheezing  - CBC with platelets and differential; Future    Anxiety with flying  - LORazepam (ATIVAN) 0.5 MG tablet; Take 1 tablet (0.5 mg) by mouth every 6 hours as needed for anxiety  - hydrOXYzine HCl (ATARAX) 25 MG tablet; Take 1 tablet (25 mg) by mouth 3 times daily as needed for anxiety    Mood swings and depression   she wants to try sertraline because it has helped with postpartum depression, will try a small dose.  Additionally with mood fluctuations, discussed possibility of trying a Lamictal and if not effective olanzapine.  She will follow-up again in 2 months to see how she is doing on sertraline.    Adjustment disorder with mixed anxiety and depressed mood  As above  - sertraline (ZOLOFT) 25 MG tablet; Take 1 tablet (25 mg) by mouth daily  - hydrOXYzine HCl (ATARAX) 25 MG tablet; Take 1 tablet (25 mg) by mouth 3 times daily as needed for anxiety    Hyperlipidemia LDL goal <100  LDL is tiny bit elevated, known to be, she does not have history of eating disorder.  Overall discussed to cut back on red meat but continue consuming fish and poultry and try to limit saturated fats.  - Lipid panel reflex to direct LDL Fasting; Future  - Comprehensive metabolic panel; Future  - TSH with free T4 reflex; Future      BMI  Estimated body mass index is 25.04 kg/m  as calculated from the following:    Height as of this encounter: 1.524 m (5').    Weight as of this encounter: 58.2 kg (128 lb 3.2 oz).       Depression Screening Follow  Up        2024     1:35 PM   PHQ   PHQ-9 Total Score 11   Q9: Thoughts of better off dead/self-harm past 2 weeks More than half the days   F/U: Thoughts of suicide or self-harm Yes   F/U: Self harm-plan No   F/U: Self-harm action No   F/U: Safety concerns No   Follow Up Actions Taken      Discussed the following ways the patient can remain in a safe environment:  be around others  Counseling  Appropriate preventive services were discussed with this patient, including applicable screening as appropriate for fall prevention, nutrition, physical activity, Tobacco-use cessation, weight loss and cognition.  Checklist reviewing preventive services available has been given to the patient.  Reviewed patient's diet, addressing concerns and/or questions.   She is at risk for lack of exercise and has been provided with information to increase physical activity for the benefit of her well-being.   The patient's PHQ-9 score is consistent with moderate depression. She was provided with information regarding depression.           Johanny Hung is a 38 year old, presenting for the following:  Physical        2024     1:39 PM   Additional Questions   Roomed by [facility] Care Directive  Patient does not have a Health Care Directive or Living Will:     HPI  Anabell Is a pleasant-year-old female with history of intermittent asthma, anxiety, depression, mood fluctuations, IBS, , chronic fatigue, previous history of binge eating and anorexia.  She is currently here for a physical.  I have not seen her since 2018.    Anabell continues to struggle with depression and anxiety.  She has a therapist and does a lot of technique managing how she feels however its gotten harder.  She does have severe anxiety episodes before she needs to travel which causes nausea vomiting and diarrhea in the night before.  Depression fluctuates, there are periods during the day when she feels significantly depressed for a few  hours and after she does her coping skills it gets better but then returns.  No clear family history of bipolar depression or lena but are not sure if she has mild hypomania symptoms.  Usually she does sleep a lot at night but recently has been having problems with insomnia and sometimes does not feel the next day that she needs more sleep.    Postpartum she did have severe depression with suicidal ideation and was treated with sertraline and felt good and then stopped it after she felt better.  Generally she does not like to take medications.    Her sister was recently diagnosed with a acute leukemia.  She was treated successfully and leukemia currently in remission.    Asthma has been under control.  When she is having upper respiratory infections she uses nebulizer at home, currently she does not have a inhaler and would like to get 1.  She has allergies to dust, cats and dogs.  She denies any persistent symptoms.    Recently she has been experiencing itchy rash a week before her menstrual cycle.  Zyrtec and Allegra have not helped.  She saw dermatology and they feel it is an allergic reaction to estrogen.    She frequently feels leg cramps and achiness especially with minimal amount of activity.  She denies any dizziness with exertion or blue fingers or toes.    She used to be a competitive dancer in college years and did have eating disorder of his bulimia and anorexia.            2/23/2024   General Health   How would you rate your overall physical health? Good   Feel stress (tense, anxious, or unable to sleep) Very much   (!) STRESS CONCERN      2/23/2024   Nutrition   Three or more servings of calcium each day? Yes   Diet: Breakfast skipped   How many servings of fruit and vegetables per day? 4 or more   How many sweetened beverages each day? 0-1         2/23/2024   Exercise   Days per week of moderate/strenous exercise 3 days         2/23/2024   Social Factors   Frequency of gathering with friends or  relatives Three times a week   Worry food won't last until get money to buy more No   Food not last or not have enough money for food? No   Do you have housing?  Yes   Are you worried about losing your housing? No   Lack of transportation? No   Unable to get utilities (heat,electricity)? No         2/23/2024   Dental   Dentist two times every year? Yes         2/23/2024   TB Screening   Were you born outside of [place]?  No       Today's PHQ-9 Score:       2/23/2024     1:35 PM   PHQ-9 SCORE   PHQ-9 Total Score MyChart 11 (Moderate depression)   PHQ-9 Total Score 11         2/23/2024   Substance Use   Alcohol more than 3/day or more than 7/wk No   Do you use any other substances recreationally? No     Social History     Tobacco Use    Smoking status: Never    Smokeless tobacco: Never   Substance Use Topics    Alcohol use: Not Currently     Comment: Alcoholic Drinks/day: occasional    Drug use: Not Currently     Types: Marijuana, Amphetamines     Comment: Drug use:  rare - previous amphetemine use         2/23/2024   Breast Cancer Screening   Family history of breast, colon, or ovarian cancer? Yes         2/23/2024   LAST FHS-7 RESULTS   1st degree relative breast or ovarian cancer Yes   Any relative bilateral breast cancer No   Any male have breast cancer No   Any woman have breast and ovarian cancer No   Any woman with breast cancer before 50yrs No   2 or more relatives with breast and/or ovarian cancer Yes   2 or more relatives with breast and/or bowel cancer No           2/23/2024   One time HIV Screening   Previous HIV test? Yes         2/23/2024   STI Screening   New sexual partner(s) since last STI/HIV test? No     History of abnormal Pap smear: No        5/25/2023     2:00 PM   PAP / HPV   PAP Negative for Intraepithelial Lesion or Malignancy (NILM)            2/23/2024   Contraception/Family Planning   Questions about contraception or family planning No        Reviewed and updated as needed this visit by  Provider                      Review of systems: As above     Objective    Exam  /70   Pulse 86   Temp 98.1  F (36.7  C) (Oral)   Resp 16   Ht 1.524 m (5')   Wt 58.2 kg (128 lb 3.2 oz)   LMP 02/14/2024 (Approximate)   SpO2 100%   BMI 25.04 kg/m     Estimated body mass index is 25.04 kg/m  as calculated from the following:    Height as of this encounter: 1.524 m (5').    Weight as of this encounter: 58.2 kg (128 lb 3.2 oz).    Physical Exam  General: well appearing female, alert and oriented x3  EYES: Eyelids, conjunctiva, and sclera were normal. Pupils were normal.   HEAD, EARS, NOSE, MOUTH, AND THROAT: no cervical LAD, no thyromegaly or nodules appreciated. TMs are visualized and normal, oropharynx is clear.  RESPIRATORY: respirations non labored, CTA bl, no wheezes, rales, no forced expiratory wheezing.  CARDIOVASCULAR: Heart rate and rhythm were normal. No murmurs, rubs,gallops. There was no peripheral edema. No carotid bruits.  GASTROINTESTINAL: Positive bowel sounds, abdomen is soft, non tender, non distended.     MUSCULOSKELETAL: Muscle mass was normal for age. No joint synovitis or deformity.  LYMPHATIC: There were no enlarged nodes palpable.  SKIN/HAIR/NAILS: Skin color was normal.  No rashes.  NEUROLOGIC: The patient was alert and oriented.  Speech was normal.  There is no facial asymmetry.   PSYCHIATRIC:  Mood and affect were normal.   Breast exam: No axillary lymphadenopathy, no skin changes appreciated.  Breast masses.      Signed Electronically by: Lamar Gabriel MD    Answers submitted by the patient for this visit:  Patient Health Questionnaire (Submitted on 2/23/2024)  If you checked off any problems, how difficult have these problems made it for you to do your work, take care of things at home, or get along with other people?: Somewhat difficult  PHQ9 TOTAL SCORE: 11

## 2025-02-03 ENCOUNTER — OFFICE VISIT (OUTPATIENT)
Dept: FAMILY MEDICINE | Facility: CLINIC | Age: 40
End: 2025-02-03
Payer: COMMERCIAL

## 2025-02-03 VITALS
BODY MASS INDEX: 27.47 KG/M2 | SYSTOLIC BLOOD PRESSURE: 100 MMHG | HEART RATE: 75 BPM | TEMPERATURE: 97.7 F | WEIGHT: 139.9 LBS | DIASTOLIC BLOOD PRESSURE: 60 MMHG | HEIGHT: 60 IN | OXYGEN SATURATION: 99 % | RESPIRATION RATE: 18 BRPM

## 2025-02-03 DIAGNOSIS — R21 RASH AND NONSPECIFIC SKIN ERUPTION: Primary | ICD-10-CM

## 2025-02-03 DIAGNOSIS — Z23 ENCOUNTER FOR IMMUNIZATION: ICD-10-CM

## 2025-02-03 PROCEDURE — 90471 IMMUNIZATION ADMIN: CPT

## 2025-02-03 PROCEDURE — 90656 IIV3 VACC NO PRSV 0.5 ML IM: CPT

## 2025-02-03 PROCEDURE — G2211 COMPLEX E/M VISIT ADD ON: HCPCS

## 2025-02-03 PROCEDURE — 99214 OFFICE O/P EST MOD 30 MIN: CPT | Mod: 25

## 2025-02-03 RX ORDER — PREDNISONE 20 MG/1
40 TABLET ORAL DAILY
Qty: 10 TABLET | Refills: 0 | Status: SHIPPED | OUTPATIENT
Start: 2025-02-03 | End: 2025-02-08

## 2025-02-03 RX ORDER — TIMOLOL MALEATE 5 MG/ML
1 SOLUTION/ DROPS OPHTHALMIC
COMMUNITY
Start: 2024-12-19

## 2025-02-03 RX ORDER — TRIAMCINOLONE ACETONIDE 1 MG/G
OINTMENT TOPICAL
COMMUNITY
Start: 2024-12-19

## 2025-02-03 RX ORDER — SERTRALINE HYDROCHLORIDE 25 MG/1
25 TABLET, FILM COATED ORAL DAILY
Qty: 30 TABLET | Refills: 3 | Status: CANCELLED | OUTPATIENT
Start: 2025-02-03

## 2025-02-03 ASSESSMENT — ASTHMA QUESTIONNAIRES
QUESTION_1 LAST FOUR WEEKS HOW MUCH OF THE TIME DID YOUR ASTHMA KEEP YOU FROM GETTING AS MUCH DONE AT WORK, SCHOOL OR AT HOME: NONE OF THE TIME
ACT_TOTALSCORE: 25
QUESTION_4 LAST FOUR WEEKS HOW OFTEN HAVE YOU USED YOUR RESCUE INHALER OR NEBULIZER MEDICATION (SUCH AS ALBUTEROL): NOT AT ALL
QUESTION_5 LAST FOUR WEEKS HOW WOULD YOU RATE YOUR ASTHMA CONTROL: COMPLETELY CONTROLLED
ACT_TOTALSCORE: 25
QUESTION_2 LAST FOUR WEEKS HOW OFTEN HAVE YOU HAD SHORTNESS OF BREATH: NOT AT ALL
QUESTION_3 LAST FOUR WEEKS HOW OFTEN DID YOUR ASTHMA SYMPTOMS (WHEEZING, COUGHING, SHORTNESS OF BREATH, CHEST TIGHTNESS OR PAIN) WAKE YOU UP AT NIGHT OR EARLIER THAN USUAL IN THE MORNING: NOT AT ALL

## 2025-02-03 ASSESSMENT — PATIENT HEALTH QUESTIONNAIRE - PHQ9
SUM OF ALL RESPONSES TO PHQ QUESTIONS 1-9: 5
10. IF YOU CHECKED OFF ANY PROBLEMS, HOW DIFFICULT HAVE THESE PROBLEMS MADE IT FOR YOU TO DO YOUR WORK, TAKE CARE OF THINGS AT HOME, OR GET ALONG WITH OTHER PEOPLE: NOT DIFFICULT AT ALL
SUM OF ALL RESPONSES TO PHQ QUESTIONS 1-9: 5

## 2025-02-03 ASSESSMENT — PAIN SCALES - GENERAL: PAINLEVEL_OUTOF10: MILD PAIN (2)

## 2025-02-03 NOTE — PATIENT INSTRUCTIONS
I am prescribing prednisone 40mg daily for 5 days. This medication is an oral steroid.  Oftentimes an oral steroid can help to reduce inflammation that is causing ongoing discomfort. Please take this medication as the packaging describes, and be sure to finish the entire course. These types of medication can cause side effects including insomnia, increased hunger, and emotional irritability.  Please take it in the morning with food to help reduce the side effects, and they should resolve once you are done taking the medication.     I have placed a referral to dermatology.  Please call the scheduling line to make an appointment with them at your earliest convenience.    It is important to seek immediate medical attention if you are having symptoms of severe worsening swelling, acutely spreading rash, streaking or spreading redness, loss of strength or sensation in the affected extremities, chest pain, fever, shortness of breath, palpitations, or any changes in your mental status.

## 2025-02-03 NOTE — PROGRESS NOTES
Assessment & Plan     (R21) Rash and nonspecific skin eruption  (primary encounter diagnosis)  Comment: Chronic with acute flare.  No concerns for acute cellulitis, patient is not toxic appearing, in no findings that would warrant the need for immediate medical attention.  Longstanding history of waxing and waning rash with no treatment option that his specifically managed concerns completely.  Adding on a course of oral steroids to reduce excess inflammation and itching, and referring to dermatology for ongoing evaluation, as I do think that biopsy may be helpful for more concrete diagnosis.  Has had poor response to antifungals, topical nonsteroidals, and the only thing that has been largely helpful has been topical steroids.  This makes inflammatory process such as uncontrolled eczema bit more likely, but would like further specialty evaluation by dermatology. Offered education on medications including appropriate dosing, possible side effects, and possible adverse effects.  Education given on return to clinic instructions as well as alarm signs that would require the need for immediate medical attention.  Patient attested to understanding.  Plan: predniSONE (DELTASONE) 20 MG tablet, Adult         Dermatology  Referral    (Z23) Encounter for immunization  Comment: After informed consent was offered, patient elected to move forward with influenza immunization in the clinic today  Plan: INFLUENZA VACCINE,SPLIT         VIRUS,TRIVALENT,PF(FLUZONE)       This progress note has been dictated, with use of voice recognition software. Any grammatical, typographical, or context errors are unintentional and inherent to use of voice recognition software.     The longitudinal plan of care for the diagnosis(es)/condition(s) as documented were addressed during this visit. Due to the added complexity in care, I will continue to support Anabell in the subsequent management and with ongoing continuity of  "care.    Prescription drug management  I spent a total of 20 minutes on the day of the visit.   Time spent by me today doing chart review, history and exam, documentation and further activities per the note    FUTURE APPOINTMENTS:       - Follow-up visit with dermatology at next available appointment       - Follow-up for annual visit or as needed  Patient Instructions   I am prescribing prednisone 40mg daily for 5 days. This medication is an oral steroid.  Oftentimes an oral steroid can help to reduce inflammation that is causing ongoing discomfort. Please take this medication as the packaging describes, and be sure to finish the entire course. These types of medication can cause side effects including insomnia, increased hunger, and emotional irritability.  Please take it in the morning with food to help reduce the side effects, and they should resolve once you are done taking the medication.     I have placed a referral to dermatology.  Please call the scheduling line to make an appointment with them at your earliest convenience.    It is important to seek immediate medical attention if you are having symptoms of severe worsening swelling, acutely spreading rash, streaking or spreading redness, loss of strength or sensation in the affected extremities, chest pain, fever, shortness of breath, palpitations, or any changes in your mental status.      Johanny Hung is a 39 year old, presenting for the following health issues:  Derm Problem (Pt reports \"rash under arms and flares down the arms and down the .\" Has been persistent since 2021.)        2/3/2025     1:47 PM   Additional Questions   Roomed by Zahra GORDILLO Rn   Accompanied by self         2/3/2025     1:47 PM   Patient Reported Additional Medications   Patient reports taking the following new medications none     History of Present Illness       Reason for visit:  Skin rash    She eats 2-3 servings of fruits and vegetables daily.She consumes 0 sweetened " beverage(s) daily.She exercises with enough effort to increase her heart rate 30 to 60 minutes per day.  She exercises with enough effort to increase her heart rate 4 days per week.   She is taking medications regularly.     Anabell is a 39-year-old female with a past medical history significant for asthma, IBS with diarrhea, irregular menstrual cycles, depression, and anxiety who presents today with concerns for ongoing rash.  Patient reports that in 2021 shortly after receiving her COVID vaccination she began to experience a profound and full body rash.  She tried some herbal remedies at home, but the rash severity increased, and was covering her entire body.  She was seen in the emergency department and diagnosed with a staph infection, and started on a course of antibiotics and oral steroids.  Since that time the rash severity has waxed and waned, but has never completely gone away.  She notes that at this point it has largely settled underneath her arms bilaterally, and waxes and wanes in severity mostly in this area at this point.  Patient has been seen for this concern a number of times over the years.  She has been diagnosed with a number of different things including progesterone intolerance initially that was treated with a course of oral contraceptive.  When this was not helpful, she sought a second opinion.  She has been treated with topical nonsteroidals with a concern for eczema, and multiple courses of antifungals with concerns for tinea infection.  None of these have been helpful, and the only thing at this point that seems to benefit not only the itching but the rash is topical triamcinolone cream.  She describes the rash to be consistently itchy, and occasionally it becomes so inflamed and painful that she describes it as a 10 out of 10 pain.  When she itches it enough, the rash occasionally does ooze, but she declines that there are open sores or lesions in the area.  She declines any fever, chills,  "body aches, swelling in the area, streaking or spreading redness, numbness, tingling, or weakness in her extremities, fatigue, vomiting, or diarrhea.  She declines that the rash seems to be made worse by certain topical products, foods, or environmental irritants.  She has been seen by allergy as a child, and was diagnosed with an allergy to dust mites, but outside of that she has no known specific allergies.        Review of Systems  Constitutional, HEENT, cardiovascular, pulmonary, gi and gu systems are negative, except as otherwise noted.      Objective    /60 (BP Location: Left arm, Patient Position: Left side, Cuff Size: Adult Regular)   Pulse 75   Temp 97.7  F (36.5  C) (Temporal)   Resp 18   Ht 1.53 m (5' 0.25\")   Wt 63.5 kg (139 lb 14.4 oz)   LMP 01/13/2025 (Approximate)   SpO2 99%   BMI 27.10 kg/m    Body mass index is 27.1 kg/m .  Physical Exam   GENERAL: alert and no distress  EYES: Eyes grossly normal to inspection, PERRL and conjunctivae and sclerae normal  HENT: ear canals and TM's normal, nose and mouth without ulcers or lesions  RESP: lungs clear to auscultation - no rales, rhonchi or wheezes  CV: regular rate and rhythm, normal S1 S2, no S3 or S4, no murmur, click or rub, no peripheral edema  ABDOMEN: soft, nontender, no hepatosplenomegaly, no masses and bowel sounds normal  SKIN: Erythematous maculopapular rash across bilateral axillary region without open sores, surrounding edema, or streaking erythema    Nahomy Troncoso DNP FNP-C  Family Nurse Practitioner - Same Day Provider  [facility] - [place]        Signed Electronically by: MAXIMO Jade CNP    "

## 2025-02-04 ENCOUNTER — TELEPHONE (OUTPATIENT)
Dept: DERMATOLOGY | Facility: CLINIC | Age: 40
End: 2025-02-04
Payer: COMMERCIAL

## 2025-02-04 NOTE — TELEPHONE ENCOUNTER
This encounter is being sent to inform the clinic that this patient has a referral from [facility] for the diagnoses of rash-chronic rash not responding to a number of treatments - hoping for biopsy  and has requested that this patient be seen within 1-2 weeks.  Based on the availability of our provider(s), we are unable to accommodate this request.    Were all sites offered this patient?  Yes  - pt is open to all locations except [facility]  - she would like to see Tawnya Zamora but understands that something sooner may be available with another provider    Does scheduling algorithm request to schedule next available?  Patient has been scheduled for the first available opening with Tawnya Zamora on 08/04/2025.  We have informed the patient that the clinic will review their referral and reach out if a sooner appointment is medically necessary.

## 2025-02-04 NOTE — TELEPHONE ENCOUNTER
S/w pt who states the rash started in 2021 and has not responded to multiple treatments.  Pt would like to see Tawnya Zamora and scheduled on Thursday 3/27 at 1 pm at  [facility].    Lubna SANTANA RN  [facility]  [phone]